# Patient Record
Sex: FEMALE | HISPANIC OR LATINO | Employment: UNEMPLOYED | URBAN - METROPOLITAN AREA
[De-identification: names, ages, dates, MRNs, and addresses within clinical notes are randomized per-mention and may not be internally consistent; named-entity substitution may affect disease eponyms.]

---

## 2023-08-16 ENCOUNTER — HOSPITAL ENCOUNTER (OUTPATIENT)
Facility: HOSPITAL | Age: 28
Discharge: HOME | End: 2023-08-16
Attending: PEDIATRICS | Admitting: PEDIATRICS
Payer: COMMERCIAL

## 2023-08-16 VITALS
OXYGEN SATURATION: 100 % | SYSTOLIC BLOOD PRESSURE: 145 MMHG | BODY MASS INDEX: 37.21 KG/M2 | DIASTOLIC BLOOD PRESSURE: 85 MMHG | HEIGHT: 63 IN | WEIGHT: 210 LBS | TEMPERATURE: 98.5 F | RESPIRATION RATE: 17 BRPM | HEART RATE: 85 BPM

## 2023-08-16 DIAGNOSIS — N64.4 BREAST PAIN IN FEMALE: Primary | ICD-10-CM

## 2023-08-16 PROCEDURE — G0463 HOSPITAL OUTPT CLINIC VISIT: HCPCS | Performed by: PEDIATRICS

## 2023-08-16 RX ORDER — CLINDAMYCIN HYDROCHLORIDE 150 MG/1
450 CAPSULE ORAL 3 TIMES DAILY
Qty: 63 CAPSULE | Refills: 0 | Status: SHIPPED | OUTPATIENT
Start: 2023-08-16 | End: 2023-08-23

## 2023-08-16 RX ORDER — ALBUTEROL SULFATE 2.5 MG/3ML
2.5 SOLUTION RESPIRATORY (INHALATION) EVERY 4 HOURS PRN
COMMUNITY
Start: 2023-06-06

## 2023-08-16 NOTE — FSED PROVIDER NOTE
"Emergency Medicine Evaluation Note  Subjective   History of Present Illness    HPI: Amira Akers is a 28 y.o. female who presents to the ED with complaints of left breast pain that began without specific inciting event that began yesterday.  Patient denies any trauma, denies any bleeding, drainage, discharge or weep.  Patient states subjective fevers and chills.  Patient with history of asthma, no other medical problems, no immunocompromise status.  Patient denies any trauma.  No recent breast-feeding.  Patient denies concerns for pregnancy as menstrual cycle 2 weeks prior.  Patient states new left nipple inversion when pain started.  Patient without PCP in the area, recently moved to [facility].  No other concomitant symptoms. No other known aggravating or alleviating factors.      ROS  Review of Systems   Constitutional:  Positive for chills and fever. Negative for activity change and appetite change.   HENT: Negative.     Eyes: Negative.    Respiratory: Negative.     Cardiovascular: Negative.    Gastrointestinal: Negative.    Endocrine: Negative.    Genitourinary: Negative.    Musculoskeletal: Negative.    Skin:         Breast pain as described above.   Allergic/Immunologic: Negative.    Neurological: Negative.    Hematological: Negative.    Psychiatric/Behavioral: Negative.       Previous History:  No past medical history on file.  No past surgical history on file.     No family history on file.  Allergies   Allergen Reactions    Penicillins Hives     Current Outpatient Medications   Medication Instructions    albuterol (PROVENTIL) 2.5 mg, Inhalation, Every 4 Hours PRN    clindamycin (CLEOCIN) 450 mg, Oral, 3 Times Daily       Objective   Physical Exam  Patient Vitals for the past 24 hrs:   BP Temp Temp src Pulse Resp SpO2 Height Weight   08/16/23 1021 145/85 98.5 øF (36.9 øC) Oral 85 17 100 % 160 cm (63\") 95.3 kg (210 lb)     Physical Exam  Vitals and nursing note reviewed.   Constitutional:       " General: She is not in acute distress.     Appearance: She is normal weight. She is not toxic-appearing.   HENT:      Head: Normocephalic and atraumatic.      Nose: Nose normal. No congestion.      Mouth/Throat:      Mouth: Mucous membranes are moist.      Pharynx: Oropharynx is clear.   Eyes:      General:         Right eye: No discharge.         Left eye: No discharge.      Extraocular Movements: Extraocular movements intact.      Conjunctiva/sclera: Conjunctivae normal.   Cardiovascular:      Rate and Rhythm: Normal rate and regular rhythm.      Pulses: Normal pulses.      Heart sounds: Normal heart sounds.   Pulmonary:      Effort: Pulmonary effort is normal. No respiratory distress.      Breath sounds: Normal breath sounds. No wheezing, rhonchi or rales.   Chest:      Chest wall: No tenderness.   Abdominal:      General: Abdomen is flat. There is no distension.      Palpations: Abdomen is soft.      Tenderness: There is no abdominal tenderness. There is no right CVA tenderness, left CVA tenderness, guarding or rebound.   Musculoskeletal:         General: No swelling, tenderness, deformity or signs of injury. Normal range of motion.      Cervical back: Normal range of motion and neck supple.   Skin:     Capillary Refill: Capillary refill takes less than 2 seconds.      Comments: Breast exam performed with nurse Kelly at bedside.  Patient with tenderness palpation over lateral left breast without overlying skin changes.  Nipple inversion appreciated without surrounding skin changes.  No bleeding, drainage, discharge or weep.  Fibrous tissue appreciated without palpable pocket of fluctuance.  No adnexal lymphadenopathy.   Neurological:      General: No focal deficit present.      Mental Status: She is alert.   Psychiatric:         Mood and Affect: Mood normal.         Behavior: Behavior normal.       Results  Labs Reviewed - No data to display  No orders to display     The laboratory results, imaging results and  other diagnostic exam results were reviewed in the EMR.     Procedures  Procedures    Medical Decision Making    Patient presents to the ED as described above.  Vital signs stable and unremarkable.  Physical exam as described above.  No obvious evidence of infection or breast tissue however given tenderness with subjective fevers and chills, patient will be initiated on course of antibiotics.  Discussed at length multiple other possible etiologies of patient's symptoms and need to follow as an outpatient for further evaluation and management, potential advanced imaging as no advanced breast imaging available at this facility. Patient was  informed of physical exam findings.  They verbalized understanding and agreement with treatment care plan.  Given strict return precautions.  All questions answered.  Patient is predominately [place]-speaking, all interactions translated through bedside virtual interpretation services.    Diagnosis  Final diagnoses:   Breast pain in female       Disposition  ED Disposition       ED Disposition   Discharge    Condition   Stable    Comment   --             PATIENT CONNECTION - [facility] [address] [phone]  Call in 1 day  or primary care physician, for contunity of care    [place] [address] [address] [place] [id number] [phone]  Go to   As needed, If symptoms worsen

## 2023-08-16 NOTE — DISCHARGE INSTRUCTIONS
Amira Akers can safely take 1000 mg of acetaminophen (Tylenol)  and 600 mg of ibuprofen (Motrin) every six hours as needed.

## 2023-08-22 ENCOUNTER — APPOINTMENT (OUTPATIENT)
Dept: GENERAL RADIOLOGY | Facility: HOSPITAL | Age: 28
End: 2023-08-22
Payer: COMMERCIAL

## 2023-08-22 ENCOUNTER — HOSPITAL ENCOUNTER (EMERGENCY)
Facility: HOSPITAL | Age: 28
Discharge: HOME | End: 2023-08-22
Attending: EMERGENCY MEDICINE | Admitting: EMERGENCY MEDICINE
Payer: COMMERCIAL

## 2023-08-22 VITALS
SYSTOLIC BLOOD PRESSURE: 108 MMHG | BODY MASS INDEX: 38.45 KG/M2 | HEART RATE: 87 BPM | DIASTOLIC BLOOD PRESSURE: 71 MMHG | RESPIRATION RATE: 16 BRPM | OXYGEN SATURATION: 98 % | WEIGHT: 217 LBS | HEIGHT: 63 IN | TEMPERATURE: 99.3 F

## 2023-08-22 DIAGNOSIS — N64.4 BREAST PAIN, LEFT: Primary | ICD-10-CM

## 2023-08-22 DIAGNOSIS — S93.601A FOOT SPRAIN, RIGHT, INITIAL ENCOUNTER: ICD-10-CM

## 2023-08-22 LAB — B-HCG UR QL: NEGATIVE

## 2023-08-22 PROCEDURE — 81025 URINE PREGNANCY TEST: CPT | Performed by: EMERGENCY MEDICINE

## 2023-08-22 PROCEDURE — 73630 X-RAY EXAM OF FOOT: CPT

## 2023-08-22 PROCEDURE — 99283 EMERGENCY DEPT VISIT LOW MDM: CPT

## 2023-08-22 PROCEDURE — 73610 X-RAY EXAM OF ANKLE: CPT

## 2023-08-22 RX ORDER — MELOXICAM 15 MG/1
15 TABLET ORAL DAILY
Qty: 10 TABLET | Refills: 0 | Status: SHIPPED | OUTPATIENT
Start: 2023-08-22

## 2023-08-22 RX ORDER — DOXYCYCLINE 100 MG/1
100 CAPSULE ORAL 2 TIMES DAILY
Qty: 14 CAPSULE | Refills: 0 | Status: SHIPPED | OUTPATIENT
Start: 2023-08-22

## 2023-08-22 NOTE — ED PROVIDER NOTES
Subjective   History of Present Illness  History obtained with help of a .  Patient presents with complaints of pain left nipple.  She states that it is generalized into more of her breast.  She states started 4 days ago she has had no fever no discharge.  She states her nipple appears oily at times.  She has no complaints of right breast pain.  She reports her last period was the first of this month.  She also reports she fell yesterday injuring her right foot.  She complains of pain to right foot.  No complaints of knee or hip pain or other injury.  Review of Systems    No past medical history on file.    Allergies   Allergen Reactions    Penicillins Hives       No past surgical history on file.    No family history on file.    Social History     Socioeconomic History    Marital status: Single     No routine medications      Objective   Physical Exam  28-year-old female awake alert.  Generally well-developed well-nourished.  Examination of breast right breast appears normal.  Left breast appears normal.  There is no erythema induration or discharge.  She does complain of some tenderness though.  No masses appreciated.  Examination of legs no hip or knee pain.  She has no ankle pain.  She complains of pain to the dorsal lateral aspect of foot.  No soft tissue swelling or bruising.  She has no complaints of arm or left leg pain.  Procedures           ED Course      Results for orders placed or performed during the hospital encounter of 08/22/23   Pregnancy, Urine - Urine, Clean Catch    Specimen: Urine, Clean Catch   Result Value Ref Range    HCG, Urine QL Negative Negative     XR Ankle 3+ View Right    Result Date: 8/22/2023  Impression: Negative. Electronically Signed: Yvette Vargas MD  8/22/2023 1:35 PM EDT  Workstation ID: [id number]    XR Foot 3+ View Right    Result Date: 8/22/2023  Impression: Negative. Electronically Signed: Yvette Vargas MD  8/22/2023 2:04 PM EDT  Workstation ID: [id number]  "  Medications - No data to display  /71 (BP Location: Left arm, Patient Position: Sitting)   Pulse 87   Temp 99.3 øF (37.4 øC) (Oral)   Resp 16   Ht 160 cm (63\")   Wt 98.4 kg (217 lb)   LMP 08/08/2023   SpO2 98%   BMI 38.44 kg/mý                                        Medical Decision Making  Amount and/or Complexity of Data Reviewed  Radiology: ordered.    Chart review: Patient was noted to have been seen on the 16th for complaint of breast pain at outside ER.  No obvious abnormality noted in either  Comorbidity: As per past history   Differential: Mastitis, strain, fracture  My EKG interpretation: Not indicated  Lab: Urine hCG negative  My Radiology review and interpretation: X-rays of right foot and ankle negative for fracture or dislocation.  Discussion/treatment: Patient's findings were discussed with her.  No obvious abnormality left breast is appreciated.  Since she is complained of pain we will place her on doxycycline and Mobic for inflammation to cover the possibility that there could be occult infection.  The importance of follow-up was discussed.  Patient was evaluated using appropriate PPE      Final diagnoses:   Breast pain, left   Foot sprain, right, initial encounter       ED Disposition  ED Disposition       ED Disposition   Discharge    Condition   Stable    Comment   --               No follow-up provider specified.       Medication List        New Prescriptions      doxycycline 100 MG capsule  Commonly known as: MONODOX  Take 1 capsule by mouth 2 (Two) Times a Day.     meloxicam 15 MG tablet  Commonly known as: Mobic  Take 1 tablet by mouth Daily. Prn pain               Where to Get Your Medications        These medications were sent to [facility]/pharmacy #[id number] - [facility] [place] - [address] [facility] - [phone] [place] [phone]   [address] [facility] [place] [address]      Phone: [phone]   doxycycline 100 MG capsule  meloxicam 15 MG tablet            Sp Butterfield MD  08/24/23 " 8035

## 2023-08-22 NOTE — ED NOTES
Patient complains of pain on the top of her foot and bottom of her foot. Patient also complains of breast tenderness. No discharge noticed at this time.

## 2023-08-22 NOTE — DISCHARGE INSTRUCTIONS
Follow-up with primary provider for repeat evaluation of breast.  Ice elevate foot restrict ambulation as needed.  Return new or worsening symptoms

## 2023-12-20 ENCOUNTER — HOSPITAL ENCOUNTER (EMERGENCY)
Facility: HOSPITAL | Age: 28
Discharge: HOME | End: 2023-12-20
Attending: EMERGENCY MEDICINE | Admitting: EMERGENCY MEDICINE
Payer: MEDICAID

## 2023-12-20 ENCOUNTER — APPOINTMENT (OUTPATIENT)
Dept: GENERAL RADIOLOGY | Facility: HOSPITAL | Age: 28
End: 2023-12-20
Payer: MEDICAID

## 2023-12-20 VITALS
BODY MASS INDEX: 38.45 KG/M2 | HEIGHT: 63 IN | RESPIRATION RATE: 16 BRPM | WEIGHT: 217 LBS | TEMPERATURE: 98.3 F | OXYGEN SATURATION: 98 % | DIASTOLIC BLOOD PRESSURE: 67 MMHG | HEART RATE: 75 BPM | SYSTOLIC BLOOD PRESSURE: 100 MMHG

## 2023-12-20 DIAGNOSIS — M79.671 RIGHT FOOT PAIN: Primary | ICD-10-CM

## 2023-12-20 PROCEDURE — 73630 X-RAY EXAM OF FOOT: CPT

## 2023-12-20 PROCEDURE — 99282 EMERGENCY DEPT VISIT SF MDM: CPT

## 2023-12-20 NOTE — DISCHARGE INSTRUCTIONS
Follow-up with podiatry.  Return for increased pain, swelling, redness or any other concerns.  Use a wrap for support.

## 2023-12-20 NOTE — ED PROVIDER NOTES
"Subjective   History of Present Illness  28-year-old  female describes some right foot pain over the last 1 year.  She describes pain along the top of her right foot with ambulation.  States she thinks she may have twisted at work over the last 1 year.  She reports no redness or swelling or fever.  History is through the friend  at the bedside.  Review of Systems    No past medical history on file.  Reportedly negative  Allergies   Allergen Reactions    Penicillins Hives       No past surgical history on file.    No family history on file.    Social History     Socioeconomic History    Marital status: Single     Prior to Admission medications    Medication Sig Start Date End Date Taking? Authorizing Provider   albuterol (PROVENTIL) (2.5 MG/3ML) 0.083% nebulizer solution Inhale 2.5 mg Every 4 (Four) Hours As Needed. 6/6/23   Provider, MD Simona   doxycycline (MONODOX) 100 MG capsule Take 1 capsule by mouth 2 (Two) Times a Day. 8/22/23   Sp Butterfield MD   meloxicam (Mobic) 15 MG tablet Take 1 tablet by mouth Daily. Prn pain 8/22/23   pS Butterfield MD     /67   Pulse 75   Temp 98.3 °F (36.8 °C) (Oral)   Resp 16   Ht 160 cm (63\")   Wt 98.4 kg (217 lb)   LMP 12/20/2023 (Approximate)   SpO2 98%   BMI 38.44 kg/m²         Objective   Physical Exam  General: Well-appearing, no acute distress  Psych: Oriented, pleasant affect  Respirations:  nonlabored respirations  Extremity: There is some mild tenderness palpation across the dorsal aspect of the right midfoot, no swelling or erythema, normal pulses in the foot and brisk cap refill distally, no ankle tenderness, plantar surface normal, normal sensorimotor function throughout the foot, calves are symmetric and nontender, Achilles nontender  Skin: No rash, normal color  Procedures           ED Course      XR Foot 3+ View Right    Result Date: 12/20/2023  Impression: Normal right foot. Electronically Signed: Vane Sadler MD  " 12/20/2023 12:47 PM EST  Workstation ID: [id number]                                          Medical Decision Making  Patient presented with some right foot pain differential diagnose including cellulitis, ischemia, compartment syndrome, trauma, foreign body    X-ray was negative.  Patient symptoms have been ongoing for about a year.  I suspect she does have some tendinitis based on location of discomfort.  At the time of discharge patient was found to not be present in the room she had apparently eloped with family so there was no opportunity to go over her results or recommendations.    Problems Addressed:  Right foot pain: complicated acute illness or injury    Amount and/or Complexity of Data Reviewed  Radiology: ordered and independent interpretation performed.     Details: My independent interpretation of x-ray image of the foot no apparent acute bony injury        Final diagnoses:   Right foot pain       ED Disposition  ED Disposition       ED Disposition   Discharge    Condition   Stable    Comment   --               No follow-up provider specified.       Medication List      No changes were made to your prescriptions during this visit.            Uriah Jeff MD  12/20/23 6840

## 2024-01-16 ENCOUNTER — HOSPITAL ENCOUNTER (OUTPATIENT)
Facility: HOSPITAL | Age: 29
Discharge: HOME | End: 2024-01-16
Attending: EMERGENCY MEDICINE | Admitting: EMERGENCY MEDICINE
Payer: MEDICAID

## 2024-01-16 VITALS
HEIGHT: 63 IN | TEMPERATURE: 98.6 F | RESPIRATION RATE: 18 BRPM | DIASTOLIC BLOOD PRESSURE: 70 MMHG | WEIGHT: 237 LBS | OXYGEN SATURATION: 99 % | HEART RATE: 93 BPM | BODY MASS INDEX: 41.99 KG/M2 | SYSTOLIC BLOOD PRESSURE: 135 MMHG

## 2024-01-16 DIAGNOSIS — J10.1 INFLUENZA B: Primary | ICD-10-CM

## 2024-01-16 DIAGNOSIS — J02.0 STREP THROAT: ICD-10-CM

## 2024-01-16 DIAGNOSIS — R05.1 ACUTE COUGH: ICD-10-CM

## 2024-01-16 DIAGNOSIS — R09.82 POSTNASAL DISCHARGE: ICD-10-CM

## 2024-01-16 LAB
FLUAV SUBTYP SPEC NAA+PROBE: NOT DETECTED
FLUBV RNA ISLT QL NAA+PROBE: DETECTED
RSV RNA NPH QL NAA+NON-PROBE: NOT DETECTED
SARS-COV-2 RNA RESP QL NAA+PROBE: NOT DETECTED
STREP A PCR: DETECTED

## 2024-01-16 PROCEDURE — G0463 HOSPITAL OUTPT CLINIC VISIT: HCPCS | Performed by: PHYSICIAN ASSISTANT

## 2024-01-16 PROCEDURE — 87651 STREP A DNA AMP PROBE: CPT

## 2024-01-16 PROCEDURE — 87637 SARSCOV2&INF A&B&RSV AMP PRB: CPT

## 2024-01-16 RX ORDER — AZITHROMYCIN 250 MG/1
TABLET, FILM COATED ORAL
Qty: 6 TABLET | Refills: 0 | Status: SHIPPED | OUTPATIENT
Start: 2024-01-16

## 2024-01-16 RX ORDER — METHYLPREDNISOLONE 4 MG/1
TABLET ORAL
Qty: 21 TABLET | Refills: 0 | Status: SHIPPED | OUTPATIENT
Start: 2024-01-16

## 2024-01-16 RX ORDER — LEVOCETIRIZINE DIHYDROCHLORIDE 5 MG/1
5 TABLET, FILM COATED ORAL EVERY EVENING
Qty: 30 TABLET | Refills: 0 | Status: SHIPPED | OUTPATIENT
Start: 2024-01-16 | End: 2024-02-15

## 2024-01-16 RX ORDER — OSELTAMIVIR PHOSPHATE 75 MG/1
75 CAPSULE ORAL 2 TIMES DAILY
Qty: 10 CAPSULE | Refills: 0 | Status: SHIPPED | OUTPATIENT
Start: 2024-01-16 | End: 2024-01-21

## 2024-01-16 NOTE — FSED PROVIDER NOTE
Subjective   History of Present Illness  Patient presents to the clinic today complaining of sinus pressure, nasal congestion, nasal drainage and cough.  Patient symptoms started yesterday.  Patient denies any chest pain, shortness of breath, abdominal pain, nausea, vomiting or fever.      Review of Systems   Constitutional:  Negative for chills and fever.   HENT:  Positive for postnasal drip, rhinorrhea, sinus pressure and sinus pain. Negative for ear pain and sore throat.    Respiratory:  Positive for cough. Negative for shortness of breath.    Cardiovascular:  Negative for chest pain.   Gastrointestinal:  Negative for abdominal pain, constipation, diarrhea, nausea and vomiting.   Musculoskeletal:  Negative for arthralgias, myalgias and neck pain.   Skin:  Negative for rash and wound.   Neurological:  Negative for dizziness, weakness, light-headedness and headaches.   All other systems reviewed and are negative.      History reviewed. No pertinent past medical history.    Allergies   Allergen Reactions    Penicillins Hives       History reviewed. No pertinent surgical history.    History reviewed. No pertinent family history.    Social History     Socioeconomic History    Marital status: Single   Tobacco Use    Smoking status: Never   Substance and Sexual Activity    Alcohol use: Not Currently    Drug use: Not Currently           Objective   Physical Exam  Vitals and nursing note reviewed.   Constitutional:       General: She is not in acute distress.     Appearance: Normal appearance. She is obese. She is not ill-appearing or toxic-appearing.   HENT:      Head: Normocephalic and atraumatic.      Right Ear: Tympanic membrane, ear canal and external ear normal. There is no impacted cerumen.      Left Ear: Tympanic membrane, ear canal and external ear normal. There is no impacted cerumen.      Nose: Congestion and rhinorrhea present.      Mouth/Throat:      Mouth: Mucous membranes are moist.      Pharynx:  Oropharyngeal exudate and posterior oropharyngeal erythema present.   Eyes:      General: No scleral icterus.        Right eye: No discharge.         Left eye: No discharge.      Extraocular Movements: Extraocular movements intact.      Conjunctiva/sclera: Conjunctivae normal.      Pupils: Pupils are equal, round, and reactive to light.   Cardiovascular:      Rate and Rhythm: Normal rate and regular rhythm.      Pulses: Normal pulses.      Heart sounds: Normal heart sounds. No murmur heard.     No friction rub. No gallop.   Pulmonary:      Effort: Pulmonary effort is normal. No respiratory distress.      Breath sounds: Normal breath sounds.   Musculoskeletal:         General: No swelling, tenderness or signs of injury. Normal range of motion.      Cervical back: Normal range of motion. No tenderness.   Skin:     General: Skin is warm and dry.      Coloration: Skin is not pale.      Findings: No erythema or rash.   Neurological:      General: No focal deficit present.      Mental Status: She is alert and oriented to person, place, and time.      Cranial Nerves: No cranial nerve deficit.   Psychiatric:         Mood and Affect: Mood normal.         Behavior: Behavior normal.         Thought Content: Thought content normal.         Judgment: Judgment normal.         Procedures           ED Course                                           Medical Decision Making  Patient was negative for RSV and COVID-19 but positive for influenza B and strep throat.  These results were gone over with her in the clinic today.  I am not concerned for sinusitis, peritonsillar abscess, retropharyngeal abscess or pneumonia.  Patient will be discharged home with a prescription for Tamiflu, azithromycin, Medrol Dosepak and Xyzal.  Patient was given the number to contact to set up a primary care provider.  Patient return to clinic if symptoms persist or worsen.    Problems Addressed:  Acute cough: complicated acute illness or injury  Influenza  B: complicated acute illness or injury  Postnasal discharge: complicated acute illness or injury  Strep throat: complicated acute illness or injury    Risk  Prescription drug management.        Final diagnoses:   Influenza B   Strep throat   Acute cough   Postnasal discharge       ED Disposition  ED Disposition       ED Disposition   Discharge    Condition   Stable    Comment   --               PATIENT CONNECTION - [facility]  [place] [address]  [phone]  Call   to set up primary care provider         Medication List        New Prescriptions      azithromycin 250 MG tablet  Commonly known as: Zithromax Z-Fadi  Take 2 tablets by mouth on day 1, then 1 tablet daily on days 2-5     levocetirizine 5 MG tablet  Commonly known as: XYZAL  Take 1 tablet by mouth Every Evening for 30 days.     methylPREDNISolone 4 MG dose pack  Commonly known as: MEDROL  Take as directed on package instructions.     oseltamivir 75 MG capsule  Commonly known as: TAMIFLU  Take 1 capsule by mouth 2 (Two) Times a Day for 5 days.               Where to Get Your Medications        These medications were sent to [facility]/pharmacy #[id number] - [place], [place] - [address] [place] [address] - [phone]  - [phone] [address] [place] [address]      Hours: 24-hours Phone: [phone]   azithromycin 250 MG tablet  levocetirizine 5 MG tablet  methylPREDNISolone 4 MG dose pack  oseltamivir 75 MG capsule

## 2024-04-04 ENCOUNTER — HOSPITAL ENCOUNTER (EMERGENCY)
Facility: HOSPITAL | Age: 29
Discharge: HOME | End: 2024-04-04
Attending: EMERGENCY MEDICINE
Payer: MEDICAID

## 2024-04-04 VITALS
SYSTOLIC BLOOD PRESSURE: 114 MMHG | HEIGHT: 62 IN | BODY MASS INDEX: 44.22 KG/M2 | WEIGHT: 240.3 LBS | TEMPERATURE: 97.8 F | HEART RATE: 78 BPM | DIASTOLIC BLOOD PRESSURE: 99 MMHG | RESPIRATION RATE: 16 BRPM | OXYGEN SATURATION: 100 %

## 2024-04-04 DIAGNOSIS — R11.2 NAUSEA AND VOMITING, UNSPECIFIED VOMITING TYPE: ICD-10-CM

## 2024-04-04 DIAGNOSIS — R19.7 DIARRHEA, UNSPECIFIED TYPE: ICD-10-CM

## 2024-04-04 DIAGNOSIS — R10.84 GENERALIZED ABDOMINAL PAIN: Primary | ICD-10-CM

## 2024-04-04 LAB
ALBUMIN SERPL-MCNC: 4.4 G/DL (ref 3.5–5.2)
ALBUMIN/GLOB SERPL: 1.3 G/DL
ALP SERPL-CCNC: 104 U/L (ref 39–117)
ALT SERPL W P-5'-P-CCNC: 18 U/L (ref 1–33)
ANION GAP SERPL CALCULATED.3IONS-SCNC: 12 MMOL/L (ref 5–15)
AST SERPL-CCNC: 15 U/L (ref 1–32)
BACTERIA UR QL AUTO: ABNORMAL /HPF
BASOPHILS # BLD AUTO: 0.03 10*3/MM3 (ref 0–0.2)
BASOPHILS NFR BLD AUTO: 0.3 % (ref 0–1.5)
BILIRUB SERPL-MCNC: 0.8 MG/DL (ref 0–1.2)
BILIRUB UR QL STRIP: NEGATIVE
BUN SERPL-MCNC: 7 MG/DL (ref 6–20)
BUN/CREAT SERPL: 14.6 (ref 7–25)
CALCIUM SPEC-SCNC: 9.8 MG/DL (ref 8.6–10.5)
CHLORIDE SERPL-SCNC: 105 MMOL/L (ref 98–107)
CLARITY UR: CLEAR
CO2 SERPL-SCNC: 23 MMOL/L (ref 22–29)
COLOR UR: YELLOW
CREAT SERPL-MCNC: 0.48 MG/DL (ref 0.57–1)
DEPRECATED RDW RBC AUTO: 41.1 FL (ref 37–54)
EGFRCR SERPLBLD CKD-EPI 2021: 133.3 ML/MIN/1.73
EOSINOPHIL # BLD AUTO: 0.11 10*3/MM3 (ref 0–0.4)
EOSINOPHIL NFR BLD AUTO: 1 % (ref 0.3–6.2)
ERYTHROCYTE [DISTWIDTH] IN BLOOD BY AUTOMATED COUNT: 13.2 % (ref 12.3–15.4)
GLOBULIN UR ELPH-MCNC: 3.3 GM/DL
GLUCOSE SERPL-MCNC: 110 MG/DL (ref 65–99)
GLUCOSE UR STRIP-MCNC: NEGATIVE MG/DL
HCT VFR BLD AUTO: 39.7 % (ref 34–46.6)
HGB BLD-MCNC: 12.6 G/DL (ref 12–15.9)
HGB UR QL STRIP.AUTO: ABNORMAL
HOLD SPECIMEN: NORMAL
HYALINE CASTS UR QL AUTO: ABNORMAL /LPF
IMM GRANULOCYTES # BLD AUTO: 0.03 10*3/MM3 (ref 0–0.05)
IMM GRANULOCYTES NFR BLD AUTO: 0.3 % (ref 0–0.5)
KETONES UR QL STRIP: NEGATIVE
LEUKOCYTE ESTERASE UR QL STRIP.AUTO: NEGATIVE
LIPASE SERPL-CCNC: 22 U/L (ref 13–60)
LYMPHOCYTES # BLD AUTO: 1.52 10*3/MM3 (ref 0.7–3.1)
LYMPHOCYTES NFR BLD AUTO: 13.2 % (ref 19.6–45.3)
MCH RBC QN AUTO: 27.3 PG (ref 26.6–33)
MCHC RBC AUTO-ENTMCNC: 31.7 G/DL (ref 31.5–35.7)
MCV RBC AUTO: 85.9 FL (ref 79–97)
MONOCYTES # BLD AUTO: 0.7 10*3/MM3 (ref 0.1–0.9)
MONOCYTES NFR BLD AUTO: 6.1 % (ref 5–12)
NEUTROPHILS NFR BLD AUTO: 79.1 % (ref 42.7–76)
NEUTROPHILS NFR BLD AUTO: 9.15 10*3/MM3 (ref 1.7–7)
NITRITE UR QL STRIP: NEGATIVE
NRBC BLD AUTO-RTO: 0 /100 WBC (ref 0–0.2)
PH UR STRIP.AUTO: <=5 [PH] (ref 5–8)
PLATELET # BLD AUTO: 296 10*3/MM3 (ref 140–450)
PMV BLD AUTO: 10.2 FL (ref 6–12)
POTASSIUM SERPL-SCNC: 3.6 MMOL/L (ref 3.5–5.2)
PROT SERPL-MCNC: 7.7 G/DL (ref 6–8.5)
PROT UR QL STRIP: NEGATIVE
RBC # BLD AUTO: 4.62 10*6/MM3 (ref 3.77–5.28)
RBC # UR STRIP: ABNORMAL /HPF
REF LAB TEST METHOD: ABNORMAL
SODIUM SERPL-SCNC: 140 MMOL/L (ref 136–145)
SP GR UR STRIP: 1.02 (ref 1–1.03)
SQUAMOUS #/AREA URNS HPF: ABNORMAL /HPF
UROBILINOGEN UR QL STRIP: ABNORMAL
WBC # UR STRIP: ABNORMAL /HPF
WBC NRBC COR # BLD AUTO: 11.54 10*3/MM3 (ref 3.4–10.8)
WHOLE BLOOD HOLD COAG: NORMAL

## 2024-04-04 PROCEDURE — 85025 COMPLETE CBC W/AUTO DIFF WBC: CPT | Performed by: EMERGENCY MEDICINE

## 2024-04-04 PROCEDURE — 96375 TX/PRO/DX INJ NEW DRUG ADDON: CPT

## 2024-04-04 PROCEDURE — 25010000002 ONDANSETRON PER 1 MG: Performed by: EMERGENCY MEDICINE

## 2024-04-04 PROCEDURE — 96374 THER/PROPH/DIAG INJ IV PUSH: CPT

## 2024-04-04 PROCEDURE — 25010000002 MORPHINE PER 10 MG: Performed by: EMERGENCY MEDICINE

## 2024-04-04 PROCEDURE — 83690 ASSAY OF LIPASE: CPT | Performed by: EMERGENCY MEDICINE

## 2024-04-04 PROCEDURE — 80053 COMPREHEN METABOLIC PANEL: CPT | Performed by: EMERGENCY MEDICINE

## 2024-04-04 PROCEDURE — 99283 EMERGENCY DEPT VISIT LOW MDM: CPT

## 2024-04-04 PROCEDURE — 25810000003 SODIUM CHLORIDE 0.9 % SOLUTION: Performed by: EMERGENCY MEDICINE

## 2024-04-04 PROCEDURE — 81001 URINALYSIS AUTO W/SCOPE: CPT | Performed by: EMERGENCY MEDICINE

## 2024-04-04 PROCEDURE — 25010000002 KETOROLAC TROMETHAMINE PER 15 MG: Performed by: EMERGENCY MEDICINE

## 2024-04-04 RX ORDER — ONDANSETRON 4 MG/1
4 TABLET, ORALLY DISINTEGRATING ORAL EVERY 8 HOURS PRN
Qty: 12 TABLET | Refills: 0 | Status: SHIPPED | OUTPATIENT
Start: 2024-04-04

## 2024-04-04 RX ORDER — MORPHINE SULFATE 2 MG/ML
2 INJECTION, SOLUTION INTRAMUSCULAR; INTRAVENOUS ONCE
Status: COMPLETED | OUTPATIENT
Start: 2024-04-04 | End: 2024-04-04

## 2024-04-04 RX ORDER — KETOROLAC TROMETHAMINE 30 MG/ML
15 INJECTION, SOLUTION INTRAMUSCULAR; INTRAVENOUS ONCE
Status: COMPLETED | OUTPATIENT
Start: 2024-04-04 | End: 2024-04-04

## 2024-04-04 RX ORDER — SODIUM CHLORIDE 0.9 % (FLUSH) 0.9 %
10 SYRINGE (ML) INJECTION AS NEEDED
Status: DISCONTINUED | OUTPATIENT
Start: 2024-04-04 | End: 2024-04-04 | Stop reason: HOSPADM

## 2024-04-04 RX ORDER — ONDANSETRON 2 MG/ML
4 INJECTION INTRAMUSCULAR; INTRAVENOUS ONCE
Status: COMPLETED | OUTPATIENT
Start: 2024-04-04 | End: 2024-04-04

## 2024-04-04 RX ORDER — TRAMADOL HYDROCHLORIDE 50 MG/1
50 TABLET ORAL EVERY 6 HOURS PRN
Qty: 12 TABLET | Refills: 0 | Status: SHIPPED | OUTPATIENT
Start: 2024-04-04

## 2024-04-04 RX ADMIN — KETOROLAC TROMETHAMINE 15 MG: 30 INJECTION, SOLUTION INTRAMUSCULAR at 18:42

## 2024-04-04 RX ADMIN — SODIUM CHLORIDE 500 ML: 9 INJECTION, SOLUTION INTRAVENOUS at 16:40

## 2024-04-04 RX ADMIN — MORPHINE SULFATE 2 MG: 2 INJECTION, SOLUTION INTRAMUSCULAR; INTRAVENOUS at 16:46

## 2024-04-04 RX ADMIN — ONDANSETRON 4 MG: 2 INJECTION INTRAMUSCULAR; INTRAVENOUS at 16:40

## 2024-04-04 NOTE — ED PROVIDER NOTES
Subjective   History of Present Illness  Patient 28 old female complaint of abdominal pain vomit diarrhea for the past 24 hours.  States she also has pain in her back.  Denies cough fever congestion dysuria or other complaint.  Patient states she is having a very heavy menstrual cycle at this time as well.      Review of Systems    No past medical history on file.    Allergies   Allergen Reactions    Penicillins Hives    Amoxicillin Unknown - High Severity       No past surgical history on file.    No family history on file.    Social History     Socioeconomic History    Marital status: Single   Tobacco Use    Smoking status: Never   Substance and Sexual Activity    Alcohol use: Not Currently    Drug use: Not Currently           Objective   Physical Exam  Neck has no adenopathy.  Lungs are clear.  Heart has regular rhythm.  Chest nontender.  Abdomen soft with mild diffuse tenderness.  Patient with normal bowel sounds without rebound or guarding.  Back has no CVA tenderness.  Procedures           ED Course      Results for orders placed or performed during the hospital encounter of 04/04/24   Comprehensive Metabolic Panel    Specimen: Blood   Result Value Ref Range    Glucose 110 (H) 65 - 99 mg/dL    BUN 7 6 - 20 mg/dL    Creatinine 0.48 (L) 0.57 - 1.00 mg/dL    Sodium 140 136 - 145 mmol/L    Potassium 3.6 3.5 - 5.2 mmol/L    Chloride 105 98 - 107 mmol/L    CO2 23.0 22.0 - 29.0 mmol/L    Calcium 9.8 8.6 - 10.5 mg/dL    Total Protein 7.7 6.0 - 8.5 g/dL    Albumin 4.4 3.5 - 5.2 g/dL    ALT (SGPT) 18 1 - 33 U/L    AST (SGOT) 15 1 - 32 U/L    Alkaline Phosphatase 104 39 - 117 U/L    Total Bilirubin 0.8 0.0 - 1.2 mg/dL    Globulin 3.3 gm/dL    A/G Ratio 1.3 g/dL    BUN/Creatinine Ratio 14.6 7.0 - 25.0    Anion Gap 12.0 5.0 - 15.0 mmol/L    eGFR 133.3 >60.0 mL/min/1.73   Lipase    Specimen: Blood   Result Value Ref Range    Lipase 22 13 - 60 U/L   Urinalysis With Microscopic If Indicated (No Culture) - Urine, Clean Catch     Specimen: Urine, Clean Catch   Result Value Ref Range    Color, UA Yellow Yellow, Straw    Appearance, UA Clear Clear    pH, UA <=5.0 5.0 - 8.0    Specific Gravity, UA 1.022 1.005 - 1.030    Glucose, UA Negative Negative    Ketones, UA Negative Negative    Bilirubin, UA Negative Negative    Blood, UA Large (3+) (A) Negative    Protein, UA Negative Negative    Leuk Esterase, UA Negative Negative    Nitrite, UA Negative Negative    Urobilinogen, UA 0.2 E.U./dL 0.2 - 1.0 E.U./dL   CBC Auto Differential    Specimen: Blood   Result Value Ref Range    WBC 11.54 (H) 3.40 - 10.80 10*3/mm3    RBC 4.62 3.77 - 5.28 10*6/mm3    Hemoglobin 12.6 12.0 - 15.9 g/dL    Hematocrit 39.7 34.0 - 46.6 %    MCV 85.9 79.0 - 97.0 fL    MCH 27.3 26.6 - 33.0 pg    MCHC 31.7 31.5 - 35.7 g/dL    RDW 13.2 12.3 - 15.4 %    RDW-SD 41.1 37.0 - 54.0 fl    MPV 10.2 6.0 - 12.0 fL    Platelets 296 140 - 450 10*3/mm3    Neutrophil % 79.1 (H) 42.7 - 76.0 %    Lymphocyte % 13.2 (L) 19.6 - 45.3 %    Monocyte % 6.1 5.0 - 12.0 %    Eosinophil % 1.0 0.3 - 6.2 %    Basophil % 0.3 0.0 - 1.5 %    Immature Grans % 0.3 0.0 - 0.5 %    Neutrophils, Absolute 9.15 (H) 1.70 - 7.00 10*3/mm3    Lymphocytes, Absolute 1.52 0.70 - 3.10 10*3/mm3    Monocytes, Absolute 0.70 0.10 - 0.90 10*3/mm3    Eosinophils, Absolute 0.11 0.00 - 0.40 10*3/mm3    Basophils, Absolute 0.03 0.00 - 0.20 10*3/mm3    Immature Grans, Absolute 0.03 0.00 - 0.05 10*3/mm3    nRBC 0.0 0.0 - 0.2 /100 WBC   Urinalysis, Microscopic Only - Urine, Clean Catch    Specimen: Urine, Clean Catch   Result Value Ref Range    RBC, UA Too Numerous to Count (A) None Seen, 0-2 /HPF    WBC, UA 0-2 None Seen, 0-2 /HPF    Bacteria, UA None Seen None Seen /HPF    Squamous Epithelial Cells, UA 0-2 None Seen, 0-2 /HPF    Hyaline Casts, UA None Seen None Seen /LPF    Methodology Automated Microscopy    Gold Top - SST   Result Value Ref Range    Extra Tube Hold for add-ons.    Light Blue Top   Result Value Ref Range    Extra  Tube Hold for add-ons.                                               Medical Decision Making  BMP shows no renal insufficiency or electrolyte abnormality.  LFTs normal with no evidence of hepatitis.  Lipase was normal without evidence of pancreatitis.  UA has red blood cells in it as noted patient is having an IV menstrual cycle as well.  There is no evidence of UTI.  Patient given morphine IV with improvement.  Due to the vomiting diarrhea and abdominal pain patient most consistent with gastroenteritis with abdominal pain.  She will be discharged with a prescription for Ultram and Zofran.  She will see MD for recheck.    Amount and/or Complexity of Data Reviewed  Labs: ordered. Decision-making details documented in ED Course.    Risk  Prescription drug management.        Final diagnoses:   Generalized abdominal pain   Nausea and vomiting, unspecified vomiting type   Diarrhea, unspecified type       ED Disposition  ED Disposition       ED Disposition   Discharge    Condition   Stable    Comment   --               No follow-up provider specified.       Medication List        New Prescriptions      ondansetron ODT 4 MG disintegrating tablet  Commonly known as: ZOFRAN-ODT  Place 1 tablet on the tongue Every 8 (Eight) Hours As Needed for Vomiting.     traMADol 50 MG tablet  Commonly known as: ULTRAM  Take 1 tablet by mouth Every 6 (Six) Hours As Needed for Severe Pain.               Where to Get Your Medications        These medications were sent to [facility]/pharmacy #[id number] - [place], [place] - [address] [place] [address] - [phone]  - [phone] [address] [place] [address]      Hours: 24-hours Phone: [phone]   ondansetron ODT 4 MG disintegrating tablet  traMADol 50 MG tablet            Milan Bhatti MD  04/04/24 0658

## 2024-08-27 ENCOUNTER — HOSPITAL ENCOUNTER (OUTPATIENT)
Facility: HOSPITAL | Age: 29
Discharge: HOME | End: 2024-08-27
Attending: EMERGENCY MEDICINE
Payer: MEDICAID

## 2024-08-27 VITALS
BODY MASS INDEX: 42.82 KG/M2 | WEIGHT: 241.7 LBS | HEART RATE: 94 BPM | SYSTOLIC BLOOD PRESSURE: 123 MMHG | HEIGHT: 63 IN | DIASTOLIC BLOOD PRESSURE: 77 MMHG | OXYGEN SATURATION: 99 % | TEMPERATURE: 99.2 F | RESPIRATION RATE: 18 BRPM

## 2024-08-27 DIAGNOSIS — Z87.09 HISTORY OF ASTHMA: ICD-10-CM

## 2024-08-27 DIAGNOSIS — U07.1 COVID-19: Primary | ICD-10-CM

## 2024-08-27 LAB
FLUAV SUBTYP SPEC NAA+PROBE: NOT DETECTED
FLUBV RNA ISLT QL NAA+PROBE: NOT DETECTED
SARS-COV-2 RNA RESP QL NAA+PROBE: DETECTED

## 2024-08-27 PROCEDURE — 99213 OFFICE O/P EST LOW 20 MIN: CPT | Performed by: NURSE PRACTITIONER

## 2024-08-27 PROCEDURE — 87636 SARSCOV2 & INF A&B AMP PRB: CPT

## 2024-08-27 PROCEDURE — G0463 HOSPITAL OUTPT CLINIC VISIT: HCPCS | Performed by: NURSE PRACTITIONER

## 2024-08-27 RX ORDER — LEVOCETIRIZINE DIHYDROCHLORIDE 5 MG/1
5 TABLET, FILM COATED ORAL EVERY EVENING
Qty: 30 TABLET | Refills: 0 | Status: SHIPPED | OUTPATIENT
Start: 2024-08-27 | End: 2024-09-26

## 2024-08-27 RX ORDER — ALBUTEROL SULFATE 90 UG/1
2 AEROSOL, METERED RESPIRATORY (INHALATION) EVERY 4 HOURS PRN
Qty: 6.7 G | Refills: 0 | Status: SHIPPED | OUTPATIENT
Start: 2024-08-27

## 2024-08-28 NOTE — FSED PROVIDER NOTE
EMERGENCY DEPARTMENT ENCOUNTER    Room Number:  11/11  Date seen:  8/27/2024  Time seen: 21:53 EDT  PCP: Provider, No Known  Historian: patient  Manage  used ID number [id number]    Discussed/obtained information from independent historians: n/a    HPI:  Chief complaint:URI symptoms, covid exposure  A complete HPI/ROS/PMH/PSH/SH/FH are unobtainable due to: n/a  Context:Amira Akers is a 29 y.o. female with a past medical history significant for asthma though she has not needed to use a recent inhaler.  She presents with cough, all over body aches and headache that started yesterday.  She has taken Tylenol for her symptoms.  She denies any dyspnea on exertion or GI symptoms.    ALLERGIES  Penicillins and Amoxicillin    PAST MEDICAL HISTORY  Active Ambulatory Problems     Diagnosis Date Noted    No Active Ambulatory Problems     Resolved Ambulatory Problems     Diagnosis Date Noted    No Resolved Ambulatory Problems     No Additional Past Medical History       PAST SURGICAL HISTORY  No past surgical history on file.    FAMILY HISTORY  No family history on file.    SOCIAL HISTORY  Social History     Socioeconomic History    Marital status: Single   Tobacco Use    Smoking status: Never   Substance and Sexual Activity    Alcohol use: Yes     Comment: occ    Drug use: Not Currently       REVIEW OF SYSTEMS  Review of Systems    All systems reviewed and negative except for those discussed in HPI.     PHYSICAL EXAM    I have reviewed the triage vital signs and nursing notes.  Vitals:    08/27/24 2109   BP: 123/77   Pulse: 94   Resp: 18   Temp: 99.2 °F (37.3 °C)   SpO2: 99%     Physical Exam    GENERAL: not distressed, mildly ill-appearing but not toxic  HENT: nares patent  EYES: no scleral icterus  NECK: no ROM limitations  CV: regular rhythm, regular rate  RESPIRATORY: normal effort, clear to auscultate bilaterally.  No wheezing or stridor.  She does have moderate coughing  ABDOMEN: soft  :  deferred  MUSCULOSKELETAL: no deformity  NEURO: alert, moves all extremities, follows commands  SKIN: warm, dry    LAB RESULTS  Recent Results (from the past 24 hour(s))   COVID-19 and FLU A/B PCR, 1 HR TAT - Swab, Nasopharynx    Collection Time: 08/27/24  9:12 PM    Specimen: Nasopharynx; Swab   Result Value Ref Range    COVID19 Detected (C) Not Detected - Ref. Range    Influenza A PCR Not Detected Not Detected    Influenza B PCR Not Detected Not Detected       Ordered the above labs and independently interpreted results.  My findings will be discussed in the ED course or medical decision making section below      PROGRESS, DATA ANALYSIS, CONSULTS AND MEDICAL DECISION MAKING    Please note that this section constitutes my independent interpretation of clinical data including lab results, radiology, EKG's.  This constitutes my independent professional opinion regarding differential diagnosis and management of this patient.  It may include any factors such as history from outside sources, review of external records, social determinants of health, management of medications, response to those treatments, and discussions with other providers.       Orders placed during this visit:  Orders Placed This Encounter   Procedures    COVID-19 and FLU A/B PCR, 1 HR TAT - Swab, Nasopharynx            Medical Decision Making  Problems Addressed:  COVID-19: complicated acute illness or injury  History of asthma: complicated acute illness or injury    Risk  Prescription drug management.    This patient presents with symptoms suspicious for likely viral upper respiratory infection and a Covid exposure. Based on history and physical doubt sinusitis. COVID test positive today.  I do not suspect underlying cardiopulmonary process. I considered, but think unlikely, dangerous causes of this patient’s symptoms to include ACS, CHF or COPD exacerbations, pneumonia, pneumothorax.The patient does have h/o asthma. She is not wheezing on exam  but I did refill her allergy medication and inhaler.  Patient is nontoxic appearing and not in need of emergent medical intervention.        DIAGNOSIS  Final diagnoses:   COVID-19   History of asthma          Medication List        Changed      * albuterol (2.5 MG/3ML) 0.083% nebulizer solution  Commonly known as: PROVENTIL  What changed: Another medication with the same name was added. Make sure you understand how and when to take each.     * albuterol sulfate  (90 Base) MCG/ACT inhaler  Commonly known as: PROVENTIL HFA;VENTOLIN HFA;PROAIR HFA  Inhale 2 puffs Every 4 (Four) Hours As Needed for Shortness of Air or Wheezing.  What changed: You were already taking a medication with the same name, and this prescription was added. Make sure you understand how and when to take each.           * This list has 2 medication(s) that are the same as other medications prescribed for you. Read the directions carefully, and ask your doctor or other care provider to review them with you.                   Where to Get Your Medications        These medications were sent to [facility]/pharmacy #[id number] - [facility] [place] - [address] [place] [address] - [phone]  - [phone] [address] [place] [address]      Hours: 24-hours Phone: [phone]   albuterol sulfate  (90 Base) MCG/ACT inhaler  levocetirizine 5 MG tablet         FOLLOW-UP  PATIENT CONNECTION - [facility] [address] [phone]    or make appointment with Primary Care or pediatrician        Latest Documented Vital Signs:  As of 22:45 EDT  BP- 123/77 HR- 94 Temp- 99.2 °F (37.3 °C) (Oral) O2 sat- 99%    Appropriate PPE utilized throughout this patient encounter to include mask, if indicated, per current protocol. Hand hygiene was performed before donning PPE and after removal when leaving the room.    Please note that portions of this were completed with a voice recognition program.     Note Disclaimer: At [facility], we believe  that sharing information builds trust and better relationships. You are receiving this note because you are receiving care at [facility] or recently visited. It is possible you will see health information before a provider has talked with you about it. This kind of information can be easy to misunderstand. To help you fully understand what it means for your health, we urge you to discuss this note with your provider.

## 2024-08-28 NOTE — DISCHARGE INSTRUCTIONS
"Please read through the information below. This information will provide you with additional instructions for home care.    Please start on a multivitamin if you are not already taking one. The best multivitamin available is the Prenatal Vitamin. It does not matter if you are a male or female. You are capable of taking this supplement. Vitamin C, Calcium, Magnesium and Zinc have shown with limited research data to be beneficial for helping the body fight off infections. [employer] have a single pill with Vitamin C, Zinc, Magnesium plus Vitamin D3. This is the most convient supplement to take with all of the additional key supplements for immune support.    COLD AND FLU     Colds are very common viral illnesses that occur year round, but primarily in winter months. Because there are many viruses that cause colds, people may experience multiple colds per year, or find that their symptoms vary with each infection. The flu is also caused by a virus, but is due to a specific virus called influenza virus. The typical symptoms of the flu are high fever, body aches, fatigue, and headache.     The main treatment for these viral infections is supportive care.     Supportive treatment consists of taking medications to treat the symptoms, so that you are more comfortable while you are recovering from the illness. Colds usually last 7-14 days and the flu about 7 days.     Over-the-counter medications for symptomatic relief may include: Mucinex (maximum 3 days), Sudafed, DayQuil/NyQuil, flonase nasal spray.  If you have history of high blood pressure please use caution with these medications.  Check with pharmacist for recommendations.  Coricidin has brand \"HBP\" which is better for patient's with high blood pressure.       If you tested positive for the flu, Tamiflu and Xofluza are prescription antiviral medications that work best if taken in the first 24 to 48 hours. If you have had symptoms longer than 48 hours " "this medication will not be beneficial. Research shows no decrease in length or severity of illness when Tamiflu is not started within 48 hours. These medications were considered when looking at your illness, symptoms and severity of your illness.     Antibiotics are also not beneficial for a viral illness. Antibiotics only work against bacteria, and not viruses.     Please read through the information below. This information will provide you with additional instructions for home care.    VIRAL RESPIRATORY INFECTIONS CAN CAUSE: SINUSITIS, CHEST COLDS, BRONCHITIS, SORE THROAT, EAR ACHES, EAR INFECTIONS, RESPIRATORY FLU, STOMACH FLU.    -ANTIBIOTICS SHOULD NOT BE USED FOR VIRAL INFECTIONS. OUR BODIES NEED 10-14 DAYS TO \"FIGHT OFF\" VIRAL INFECTIONS.   -TAKE ANTIBIOTICS ONLY IF: RECOMMENDED BY YOUR PROVIDER, YOU ARE GETTING WORSE, YOU ARE NOT GETTING BETTER IN 10-14 DAYS.  -AVOID UNNECESSARY ANTIBIOTICS, THE MORE ANTIBIOTICS PEOPLE USE THE MORE LIKELY THEY ARE TO GET INFECTIONS THAT LAST LONGER AND ARE MORE DIFFICULT TO TREAT  -COLD SYMPTOMS INCLUDE: RUNNY NOSE, NASAL CONGESTION, SNEEZING, SORE THROAT, COUGH AND HEADACHE  -CHILDREN WILL OFTEN RUN A FEVER -102 F  -COLDS OCCUR ALL YEAR ROUND  -CHILDREN WILL TYPICALLY GET 8 COLDS A YEAR    Virus precautions, simple things to do at home to help with illness:     Wash/sanitize common household surfaces with antibacterial wipes.  Especially door knobs, light switches, tablets, remote controls, game consoles, cell phones.     Change bed linens and wash bath towels/washcloths    Frequent handwashing    Cough/sneeze into your sleeve      Within one to three days, the nasal secretions usually become thicker and perhaps yellow or green. This is a normal part of the common cold and not a reason for antibiotics. Symptoms usually go away in 7-10 days. If symptoms do not resolve in 7-10 days or worsen despite recommended treatment then re-evaluation must be sought as a viral " infection can turn into a bacterial infection    Please try OTC therapy for 3-5 days. This should help with your symptoms. It would be best to start on an OTC allergy pill or an OTC medication to treat the symptoms which you have.     Take an Allergy pill for congestion, runny nose, itchy watery eyes, sinus pain or pressure.     Tylenol/Ibuprofen (age appropriate dose) for pain or fever.     Additionally, plenty of rest and fluids are also important.     Below is a list of what to do for common symptoms associated with a cold or the flu. The recommendations below are for adult patients. Please make sure the treatments you use are age appropriate as not all information noted below can be given to children.       1. SORE THROAT Ibuprofen (also called Motrin & Advil) and acetaminophen (APAP or Tylenol ) are the most effective pain relievers for a sore throat.     Adults can take 2-3 ibuprofen every 6 hours with food or Tylenol 1000 mg up to 3 times a day as needed. Additionally, salt water gargles (1 tsp. salt in 1 cup of warm water) and lozenges may provide temporary relief of a sore throat.     For children over the age of two and adults - a spoonful of honey may be beneficial for both sore throat and cough.     For children, please follow the instructions on the package. There are different formularies to consider dosing    Sore throats are caused by many medical conditions including allergies, a virus, sinus issues, strep, and more.  Try to manage your sore throat at home. If over the counter medication has not been beneficial and your symptoms are continuing to worsen we ask you seek evaluation.    A severe sore throat should be evaluated by a practitioner, especially if accompanied by a fever over 100.3.   In talking with patients, most say that spraying with chloraseptic is not helpful.    Salt water gargles will help a sore throat.  For salt water gargles combine 1 teaspoon salt to 8 ounces of warm water and  swish and spit.  This can be done 4-5 times a day with a fresh batch of salt and warm water      2. NASAL CONGESTION The best nasal decongestant for most people is pseudoephedrine (Sudafed or a generic, but NOT Sudafed PE). For adults, the usual dose is 60 mg every 4 to 6 hours. This helps with a runny nose and clogged nasal passages, making it easier to breathe. Because Sudafed can be used to make illegal drugs, it is kept behind the counter at your local drugstore. This means you have to ask for the medication and are required to show your ’s license to buy it. Sudafed may make it difficult to sleep at night, so try to avoid taking any before bed if it bothers you. People with high blood pressure should not take pseudoephedrine, phenylephrine, or Afrin nasal spray as all of these can increase blood pressure and potentially lead to strokes. Do not use Afrin nasal spray longer than 3 days, as it can make nasal congestion worse if used too long.     For children over the age of 6 you may use Delsym from over the counter.    Zyrtec or Claritin is also beneficial for allergy symptoms including nasal congestion    For nasal congestion at night, or if you have high blood pressure and can’t take Sudafed or Afrin, use decongestants that come with an antihistamine (such as Coricidan HBP Cold and Flu or Benadryl). These can sometimes make you sleepy, so be sure to see how your body reacts to the drug before driving or going to work. Saline nasal sprays may also be helpful. Many patients find the Neti-pot saline rinse to be useful in clearing out their sinuses as well.    3. *NOTE OF CAUTION: Many over the counter cold preparations also contain Tylenol/acetaminophen. Be sure to check cold remedy labels so that you do not take too much Tylenol by mistake. Maximum is 3,000 mg per 24 hours.     Influenza is a viral illness. Generally this infection has to run it's course. It can take 7-14 days for a viral illness to pass.  Please treat your symptoms with over the counter medication. If your test was positive, treatment with Tamiflu or Xofluza was considered. You have to meet specific criteria in order to be eligible for this treatment. If this medication was prescribed for you, please start on it immediately. If your symptoms have been present more than 2-3 days it may not be effective at helping you through your illness.     COVID-19 is another viral infection that we now combate within our society. This infection has shown to cause many health complications. If you were COVID positive there is additional therapies you may take to help with symptoms. These additional therapies were considered during your office visit today. Please seek re-evaluation immediately if you develop concerns for pneumonia or other secondary complication from this infection.    If you have a personal or family history of blood clots (DVT), pulumonary embolism (PE), stroke, or heart attack or are at high risk for any of these complications then please talk with your doctor or nurse practitioner about starting on a baby aspirin (81 mg tablet). These have shown to be beneficial to prevent the above complications from occurring or recurring.    If you develop gastritis (irritation of the stomach) including heartburn or indigestion then over the counter PEPCID may be beneficial. Take this medication as directed on the package.    If you develop loss of taste or smell, peppermint candy or the use of peppermint essential oil in a home diffuser for aromatic purposes may be beneficial. Please remember everything in life has risks and benefits. Neither of these are without risks and you must consider your own health conditions and home situation. Do not diffuse peppermint essential oil in a closed room where pets are unable to escape.    Generally a viral illness is worse the first 3-5 days. Over the counter medication and home therapy will help with your symptoms.  Management of your illness at home for a few days will provide us with additional information on how to help you manage your illness. Antibiotics will only be beneficial if your symptoms are caused by a bacteria.     If your symptoms do not improve in 7-10 days or your symptoms become worse despite the recommendations above then please seek re-evaluation.    When this treatment fails or symptoms continue to worsen despite this treatment, re-evaluation is advised as your viral infection or allergy flare up can turn into a secondary bacterial infection where antibiotics would be recommended    Return Precautions    Although you are being discharged from the ED today, I encourage you to return for worsening symptoms.  Things can, and do, change such that treatment at home with medication may not be adequate.      Specifically, return for any of the following:    Chest pain, shortness of breath, pain or nausea and vomiting not controlled by medications provided.    Please make a follow up with your Primary Care Provider for a blood pressure recheck.

## 2024-11-11 ENCOUNTER — HOSPITAL ENCOUNTER (EMERGENCY)
Facility: HOSPITAL | Age: 29
Discharge: HOME | End: 2024-11-12
Admitting: EMERGENCY MEDICINE
Payer: MEDICAID

## 2024-11-11 DIAGNOSIS — N93.9 VAGINAL BLEEDING: Primary | ICD-10-CM

## 2024-11-11 DIAGNOSIS — N83.201 CYST OF RIGHT OVARY: ICD-10-CM

## 2024-11-11 LAB
ABO GROUP BLD: NORMAL
ALBUMIN SERPL-MCNC: 4 G/DL (ref 3.5–5.2)
ALBUMIN/GLOB SERPL: 1.3 G/DL
ALP SERPL-CCNC: 98 U/L (ref 39–117)
ALT SERPL W P-5'-P-CCNC: 20 U/L (ref 1–33)
ANION GAP SERPL CALCULATED.3IONS-SCNC: 8.7 MMOL/L (ref 5–15)
AST SERPL-CCNC: 17 U/L (ref 1–32)
BASOPHILS # BLD AUTO: 0.05 10*3/MM3 (ref 0–0.2)
BASOPHILS NFR BLD AUTO: 0.5 % (ref 0–1.5)
BILIRUB SERPL-MCNC: 0.5 MG/DL (ref 0–1.2)
BUN SERPL-MCNC: 7 MG/DL (ref 6–20)
BUN/CREAT SERPL: 13.2 (ref 7–25)
CALCIUM SPEC-SCNC: 9 MG/DL (ref 8.6–10.5)
CHLORIDE SERPL-SCNC: 107 MMOL/L (ref 98–107)
CO2 SERPL-SCNC: 26.3 MMOL/L (ref 22–29)
CREAT SERPL-MCNC: 0.53 MG/DL (ref 0.57–1)
DEPRECATED RDW RBC AUTO: 40.6 FL (ref 37–54)
EGFRCR SERPLBLD CKD-EPI 2021: 129.4 ML/MIN/1.73
EOSINOPHIL # BLD AUTO: 0.1 10*3/MM3 (ref 0–0.4)
EOSINOPHIL NFR BLD AUTO: 0.9 % (ref 0.3–6.2)
ERYTHROCYTE [DISTWIDTH] IN BLOOD BY AUTOMATED COUNT: 12.9 % (ref 12.3–15.4)
GLOBULIN UR ELPH-MCNC: 3.2 GM/DL
GLUCOSE SERPL-MCNC: 92 MG/DL (ref 65–99)
HCG INTACT+B SERPL-ACNC: <1 MIU/ML
HCT VFR BLD AUTO: 35.9 % (ref 34–46.6)
HGB BLD-MCNC: 11.8 G/DL (ref 12–15.9)
IMM GRANULOCYTES # BLD AUTO: 0.04 10*3/MM3 (ref 0–0.05)
IMM GRANULOCYTES NFR BLD AUTO: 0.4 % (ref 0–0.5)
LYMPHOCYTES # BLD AUTO: 3.24 10*3/MM3 (ref 0.7–3.1)
LYMPHOCYTES NFR BLD AUTO: 30.5 % (ref 19.6–45.3)
MCH RBC QN AUTO: 28.4 PG (ref 26.6–33)
MCHC RBC AUTO-ENTMCNC: 32.9 G/DL (ref 31.5–35.7)
MCV RBC AUTO: 86.5 FL (ref 79–97)
MONOCYTES # BLD AUTO: 0.81 10*3/MM3 (ref 0.1–0.9)
MONOCYTES NFR BLD AUTO: 7.6 % (ref 5–12)
NEUTROPHILS NFR BLD AUTO: 6.38 10*3/MM3 (ref 1.7–7)
NEUTROPHILS NFR BLD AUTO: 60.1 % (ref 42.7–76)
NRBC BLD AUTO-RTO: 0 /100 WBC (ref 0–0.2)
NUMBER OF DOSES: NORMAL
PLATELET # BLD AUTO: 273 10*3/MM3 (ref 140–450)
PMV BLD AUTO: 10.4 FL (ref 6–12)
POTASSIUM SERPL-SCNC: 3.6 MMOL/L (ref 3.5–5.2)
PROT SERPL-MCNC: 7.2 G/DL (ref 6–8.5)
RBC # BLD AUTO: 4.15 10*6/MM3 (ref 3.77–5.28)
RH BLD: POSITIVE
SODIUM SERPL-SCNC: 142 MMOL/L (ref 136–145)
WBC NRBC COR # BLD AUTO: 10.62 10*3/MM3 (ref 3.4–10.8)

## 2024-11-11 PROCEDURE — 86900 BLOOD TYPING SEROLOGIC ABO: CPT | Performed by: NURSE PRACTITIONER

## 2024-11-11 PROCEDURE — 80053 COMPREHEN METABOLIC PANEL: CPT | Performed by: NURSE PRACTITIONER

## 2024-11-11 PROCEDURE — 84702 CHORIONIC GONADOTROPIN TEST: CPT | Performed by: NURSE PRACTITIONER

## 2024-11-11 PROCEDURE — 99284 EMERGENCY DEPT VISIT MOD MDM: CPT

## 2024-11-11 PROCEDURE — 85025 COMPLETE CBC W/AUTO DIFF WBC: CPT | Performed by: NURSE PRACTITIONER

## 2024-11-11 PROCEDURE — 86901 BLOOD TYPING SEROLOGIC RH(D): CPT | Performed by: NURSE PRACTITIONER

## 2024-11-11 RX ORDER — ACETAMINOPHEN 500 MG
1000 TABLET ORAL ONCE
Status: COMPLETED | OUTPATIENT
Start: 2024-11-11 | End: 2024-11-11

## 2024-11-11 RX ADMIN — ACETAMINOPHEN 1000 MG: 500 TABLET, FILM COATED ORAL at 23:45

## 2024-11-12 ENCOUNTER — APPOINTMENT (OUTPATIENT)
Dept: ULTRASOUND IMAGING | Facility: HOSPITAL | Age: 29
End: 2024-11-12
Payer: MEDICAID

## 2024-11-12 VITALS
HEIGHT: 63 IN | RESPIRATION RATE: 11 BRPM | OXYGEN SATURATION: 98 % | BODY MASS INDEX: 42.3 KG/M2 | HEART RATE: 79 BPM | DIASTOLIC BLOOD PRESSURE: 67 MMHG | TEMPERATURE: 98.4 F | SYSTOLIC BLOOD PRESSURE: 107 MMHG | WEIGHT: 238.76 LBS

## 2024-11-12 PROCEDURE — 76830 TRANSVAGINAL US NON-OB: CPT

## 2024-11-12 PROCEDURE — 93976 VASCULAR STUDY: CPT

## 2024-11-12 PROCEDURE — 76856 US EXAM PELVIC COMPLETE: CPT

## 2024-11-12 NOTE — DISCHARGE INSTRUCTIONS
Return to ED for new or worsening symptoms: Bleeding more than 1 pad per hour, fever or any further concern  Please follow-up with OB/GYN, call for appointment

## 2024-11-12 NOTE — ED PROVIDER NOTES
Subjective   Chief Complaint   Patient presents with    Abdominal Pain    Vaginal Bleeding - Pregnant       History of Present Illness  Patient is a 29-year-old female G6,  presents to the emergency department for evaluation of vaginal bleeding, lower abdominal pain in pregnancy.  Patient reports she started having spotting, then progressed to worsening of bleeding as well as passing clots or tissue today.  Patient reports she has had similar symptoms with a miscarriage in the past.  Does report lower abdominal pain, initially worse on the left, now also on the right.  No fevers or chills.  No STD concerns.  No dysuria        History provided by:  Patient   used: Yes ([id number])        Review of Systems    No past medical history on file.    Allergies   Allergen Reactions    Penicillins Hives    Amoxicillin Unknown - High Severity       No past surgical history on file.    No family history on file.    Social History     Socioeconomic History    Marital status: Single   Tobacco Use    Smoking status: Never   Substance and Sexual Activity    Alcohol use: Yes     Comment: occ    Drug use: Not Currently           Objective   Physical Exam  Vitals and nursing note reviewed.   Constitutional:       Appearance: She is not toxic-appearing.   HENT:      Head: Normocephalic and atraumatic.      Mouth/Throat:      Mouth: Mucous membranes are moist.      Pharynx: Oropharynx is clear.   Eyes:      Extraocular Movements: Extraocular movements intact.      Pupils: Pupils are equal, round, and reactive to light.   Cardiovascular:      Rate and Rhythm: Normal rate and regular rhythm.      Heart sounds: Normal heart sounds. No murmur heard.     No friction rub. No gallop.   Pulmonary:      Effort: Pulmonary effort is normal.      Breath sounds: Normal breath sounds.   Abdominal:      General: Abdomen is flat. Bowel sounds are normal. There is no distension or abdominal bruit. There are no signs of injury.       Palpations: Abdomen is soft.      Tenderness: There is abdominal tenderness in the right lower quadrant, suprapubic area and left lower quadrant. There is no guarding or rebound.   Genitourinary:     Comments: She was placed in the lithotomy position external genitalia were found to have no lesions or swelling.  Speculum exam shows closed cervix, small amount of blood noted in vaginal vault .The patient had no cervical motion tenderness.  Patient had no adnexal tenderness.  The patient had cultures obtained and her exam was performed with magali CARBALLO at the bedside.  Skin:     General: Skin is warm and dry.      Capillary Refill: Capillary refill takes less than 2 seconds.   Neurological:      General: No focal deficit present.      Mental Status: She is alert and oriented to person, place, and time.         Procedures           ED Course  ED Course as of 11/12/24 0438   Mon Nov 11, 2024   2233 HCG Quantitative: <1.00 [LB]   2358 HCG Quantitative: <1.00 [LB]      ED Course User Index  [LB] Becka Barba, AYANA                    No data recorded                             Medical Decision Making  Utilized for all patient interactions.  Discharge  [id number]  Chart Review: [facility] ED note 8/27/2024  Pt was Placed on appropriate monitoring.  Differential diagnoses considered for patient presentation, this list is not all inclusive of diagnoses considered: Subchronic hemorrhage, miscarriage, ectopic pregnancy  Patient presents to the ED for the above complaint, underwent the above, exam and workup.  Patient's pregnancy test here is negative with hCG being less than 1.00.  Hemoglobin hematocrit are stable.  Ultrasound obtained no findings concerning for ectopic pregnancy.  She does have a right ovarian cyst which I discussed with the patient.  No hemorrhage noted on exam.  I do feel patient be discharged from the ED and continue outpatient follow-up with OB/GYN.  Considertion was given for  admission, however patient has reassuring exam and workup in the ed and appears appropriate for discharge home and continued outpatient care.     We discussed my findings, plan of care, discharge instructions, the importance of follow up with their PCP/ and or specialist for repeat evaluation and to discuss any abnormal findings in labs or imaging that warrant further outpatient evaluation. We discussed that although a definitive diagnosis is not always found in the ED, it is believed emergent conditions have been ruled out, and patient is safe for discharge at this time.  We discussed return precautions for the emergency department.  Patient verbalizes understandings, and agrees with current plan of care.      Note Disclaimer: At [facility], we believe that sharing information builds trust and better relationships. You are receiving this note because you recently visited [facility]. It is possible you will see health information before a provider has talked with you about it. This kind of information can be easy to misunderstand. To help you fully understand what it means for your health, we urge you to discuss this note with your provider  Note dictated utilizing Dragon Dictation.  Appropriate PPE worn during patient interactions.        Final diagnoses:   Vaginal bleeding   Cyst of right ovary       ED Disposition  ED Disposition       ED Disposition   Discharge    Condition   Stable    Comment   --               PATIENT CONNECTION - [facility] [address] [phone]  Schedule an appointment as soon as possible for a visit   Call for assistance with follow up with Primary care provider-call tomorrow.    [facility] EMERGENCY DEPARTMENT  [address] [facility] [address] [phone]             Medication List      No changes were made to your prescriptions during this visit.            Becka Barba, APRN  11/12/24 0439

## 2025-01-21 ENCOUNTER — HOSPITAL ENCOUNTER (EMERGENCY)
Facility: HOSPITAL | Age: 30
Discharge: HOME | End: 2025-01-21
Attending: EMERGENCY MEDICINE | Admitting: EMERGENCY MEDICINE

## 2025-01-21 VITALS
HEART RATE: 91 BPM | TEMPERATURE: 98 F | SYSTOLIC BLOOD PRESSURE: 118 MMHG | BODY MASS INDEX: 42.52 KG/M2 | WEIGHT: 240 LBS | HEIGHT: 63 IN | DIASTOLIC BLOOD PRESSURE: 70 MMHG | RESPIRATION RATE: 20 BRPM | OXYGEN SATURATION: 96 %

## 2025-01-21 DIAGNOSIS — F41.0 PANIC ATTACK: Primary | ICD-10-CM

## 2025-01-21 LAB
ANION GAP SERPL CALCULATED.3IONS-SCNC: 10.5 MMOL/L (ref 5–15)
BASOPHILS # BLD AUTO: 0.05 10*3/MM3 (ref 0–0.2)
BASOPHILS NFR BLD AUTO: 0.4 % (ref 0–1.5)
BUN SERPL-MCNC: 9 MG/DL (ref 6–20)
BUN/CREAT SERPL: 17.6 (ref 7–25)
CALCIUM SPEC-SCNC: 9.2 MG/DL (ref 8.6–10.5)
CHLORIDE SERPL-SCNC: 104 MMOL/L (ref 98–107)
CO2 SERPL-SCNC: 24.5 MMOL/L (ref 22–29)
CREAT SERPL-MCNC: 0.51 MG/DL (ref 0.57–1)
DEPRECATED RDW RBC AUTO: 40.6 FL (ref 37–54)
EGFRCR SERPLBLD CKD-EPI 2021: 130.6 ML/MIN/1.73
EOSINOPHIL # BLD AUTO: 0.06 10*3/MM3 (ref 0–0.4)
EOSINOPHIL NFR BLD AUTO: 0.5 % (ref 0.3–6.2)
ERYTHROCYTE [DISTWIDTH] IN BLOOD BY AUTOMATED COUNT: 12.8 % (ref 12.3–15.4)
GLUCOSE SERPL-MCNC: 104 MG/DL (ref 65–99)
HCG SERPL QL: NEGATIVE
HCT VFR BLD AUTO: 36.1 % (ref 34–46.6)
HGB BLD-MCNC: 11.6 G/DL (ref 12–15.9)
IMM GRANULOCYTES # BLD AUTO: 0.07 10*3/MM3 (ref 0–0.05)
IMM GRANULOCYTES NFR BLD AUTO: 0.6 % (ref 0–0.5)
LYMPHOCYTES # BLD AUTO: 1.81 10*3/MM3 (ref 0.7–3.1)
LYMPHOCYTES NFR BLD AUTO: 15.1 % (ref 19.6–45.3)
MCH RBC QN AUTO: 27.9 PG (ref 26.6–33)
MCHC RBC AUTO-ENTMCNC: 32.1 G/DL (ref 31.5–35.7)
MCV RBC AUTO: 86.8 FL (ref 79–97)
MONOCYTES # BLD AUTO: 0.74 10*3/MM3 (ref 0.1–0.9)
MONOCYTES NFR BLD AUTO: 6.2 % (ref 5–12)
NEUTROPHILS NFR BLD AUTO: 77.2 % (ref 42.7–76)
NEUTROPHILS NFR BLD AUTO: 9.27 10*3/MM3 (ref 1.7–7)
NRBC BLD AUTO-RTO: 0 /100 WBC (ref 0–0.2)
PLATELET # BLD AUTO: 306 10*3/MM3 (ref 140–450)
PMV BLD AUTO: 10.4 FL (ref 6–12)
POTASSIUM SERPL-SCNC: 3.7 MMOL/L (ref 3.5–5.2)
QT INTERVAL: 345 MS
QTC INTERVAL: 433 MS
RBC # BLD AUTO: 4.16 10*6/MM3 (ref 3.77–5.28)
SODIUM SERPL-SCNC: 139 MMOL/L (ref 136–145)
WBC NRBC COR # BLD AUTO: 12 10*3/MM3 (ref 3.4–10.8)

## 2025-01-21 PROCEDURE — 80048 BASIC METABOLIC PNL TOTAL CA: CPT | Performed by: EMERGENCY MEDICINE

## 2025-01-21 PROCEDURE — 96374 THER/PROPH/DIAG INJ IV PUSH: CPT

## 2025-01-21 PROCEDURE — 85025 COMPLETE CBC W/AUTO DIFF WBC: CPT | Performed by: EMERGENCY MEDICINE

## 2025-01-21 PROCEDURE — 84703 CHORIONIC GONADOTROPIN ASSAY: CPT | Performed by: EMERGENCY MEDICINE

## 2025-01-21 PROCEDURE — 93005 ELECTROCARDIOGRAM TRACING: CPT | Performed by: EMERGENCY MEDICINE

## 2025-01-21 PROCEDURE — 25010000002 LORAZEPAM PER 2 MG: Performed by: EMERGENCY MEDICINE

## 2025-01-21 PROCEDURE — 99283 EMERGENCY DEPT VISIT LOW MDM: CPT

## 2025-01-21 RX ORDER — LORAZEPAM 2 MG/ML
1 INJECTION INTRAMUSCULAR ONCE
Status: COMPLETED | OUTPATIENT
Start: 2025-01-21 | End: 2025-01-21

## 2025-01-21 RX ORDER — SODIUM CHLORIDE 0.9 % (FLUSH) 0.9 %
10 SYRINGE (ML) INJECTION AS NEEDED
Status: DISCONTINUED | OUTPATIENT
Start: 2025-01-21 | End: 2025-01-21 | Stop reason: HOSPADM

## 2025-01-21 RX ADMIN — LORAZEPAM 1 MG: 2 INJECTION INTRAMUSCULAR; INTRAVENOUS at 03:04

## 2025-01-21 NOTE — DISCHARGE INSTRUCTIONS
Rest, call for primary care follow-up.  Return for increased shortness of breath, increased numbness, weakness or any other concern

## 2025-01-21 NOTE — ED PROVIDER NOTES
Subjective   History of Present Illness  29-year-old  female states that she was feeling nervous and anxious at work last night as a .  She states she was under some increased stress at work.  States that feeling continued at home and she started feeling shaky and numb and tingly all over.  She reports no chest pain or palpitations or syncope or headache or focal numbness or weakness.  Review of Systems    No past medical history on file.  Reportedly miscarriage in November  Allergies   Allergen Reactions    Penicillins Hives    Amoxicillin Unknown - High Severity       No past surgical history on file.    No family history on file.    Social History     Socioeconomic History    Marital status: Single   Tobacco Use    Smoking status: Never   Substance and Sexual Activity    Alcohol use: Yes     Comment: occ    Drug use: Not Currently       Prior to Admission medications    Medication Sig Start Date End Date Taking? Authorizing Provider   albuterol (PROVENTIL) (2.5 MG/3ML) 0.083% nebulizer solution Inhale 2.5 mg Every 4 (Four) Hours As Needed. 6/6/23   Provider, MD Simona   albuterol sulfate  (90 Base) MCG/ACT inhaler Inhale 2 puffs Every 4 (Four) Hours As Needed for Shortness of Air or Wheezing. 8/27/24   Audrey Warren APRN   azithromycin (Zithromax Z-Fadi) 250 MG tablet Take 2 tablets by mouth on day 1, then 1 tablet daily on days 2-5 1/16/24   Stefan Rivera PA   doxycycline (MONODOX) 100 MG capsule Take 1 capsule by mouth 2 (Two) Times a Day. 8/22/23   Sp Butterfield MD   levocetirizine (XYZAL) 5 MG tablet Take 1 tablet by mouth Every Evening for 30 days. 8/27/24 9/26/24  Audrey Warren APRN   meloxicam (Mobic) 15 MG tablet Take 1 tablet by mouth Daily. Prn pain 8/22/23   Sp Butterfield MD   methylPREDNISolone (MEDROL) 4 MG dose pack Take as directed on package instructions. 1/16/24   Stefan Rivera PA   ondansetron ODT (ZOFRAN-ODT) 4 MG disintegrating tablet Place 1  "tablet on the tongue Every 8 (Eight) Hours As Needed for Vomiting. 4/4/24   Milan Bhatti MD   traMADol (ULTRAM) 50 MG tablet Take 1 tablet by mouth Every 6 (Six) Hours As Needed for Severe Pain. 4/4/24   Milan Bhatti MD     /69   Pulse 98   Temp 98 °F (36.7 °C) (Oral)   Resp 20   Ht 160 cm (63\")   Wt 109 kg (240 lb)   LMP 09/24/2024 (Exact Date)   SpO2 100%   BMI 42.51 kg/m²       Objective   Physical Exam  General: Well-developed well-appearing, no acute distress, alert and appropriate  Eyes: sclera nonicteric  HEENT: Mucous membranes moist, no mucosal swelling  Neck: Supple, no nuchal rigidity, no JVD  Respirations: Respirations nonlabored, equal breath sounds bilaterally, clear lungs  Heart regular rate and rhythm, no murmurs rubs or gallops,   Abdomen soft nontender nondistended, no hepatosplenomegaly, no hernia, no mass, normal bowel sounds, no CVA tenderness  Extremities no clubbing cyanosis or edema, calves are symmetric and nontender  Neuro cranial nerves grossly intact, no focal limb deficits, no ataxia, normal gait and station  Psych oriented, pleasant affect, mildly tremulous  Skin no rash, brisk cap refill  Procedures           ED Course  Results for orders placed or performed during the hospital encounter of 01/21/25   ECG 12 Lead Altered Mental Status    Collection Time: 01/21/25  2:52 AM   Result Value Ref Range    QT Interval 345 ms    QTC Interval 433 ms   Basic Metabolic Panel    Collection Time: 01/21/25  2:58 AM    Specimen: Blood   Result Value Ref Range    Glucose 104 (H) 65 - 99 mg/dL    BUN 9 6 - 20 mg/dL    Creatinine 0.51 (L) 0.57 - 1.00 mg/dL    Sodium 139 136 - 145 mmol/L    Potassium 3.7 3.5 - 5.2 mmol/L    Chloride 104 98 - 107 mmol/L    CO2 24.5 22.0 - 29.0 mmol/L    Calcium 9.2 8.6 - 10.5 mg/dL    BUN/Creatinine Ratio 17.6 7.0 - 25.0    Anion Gap 10.5 5.0 - 15.0 mmol/L    eGFR 130.6 >60.0 mL/min/1.73   hCG, Serum, Qualitative    Collection Time: 01/21/25  2:58 AM    " Specimen: Blood   Result Value Ref Range    HCG Qualitative Negative Negative   CBC Auto Differential    Collection Time: 01/21/25  2:58 AM    Specimen: Blood   Result Value Ref Range    WBC 12.00 (H) 3.40 - 10.80 10*3/mm3    RBC 4.16 3.77 - 5.28 10*6/mm3    Hemoglobin 11.6 (L) 12.0 - 15.9 g/dL    Hematocrit 36.1 34.0 - 46.6 %    MCV 86.8 79.0 - 97.0 fL    MCH 27.9 26.6 - 33.0 pg    MCHC 32.1 31.5 - 35.7 g/dL    RDW 12.8 12.3 - 15.4 %    RDW-SD 40.6 37.0 - 54.0 fl    MPV 10.4 6.0 - 12.0 fL    Platelets 306 140 - 450 10*3/mm3    Neutrophil % 77.2 (H) 42.7 - 76.0 %    Lymphocyte % 15.1 (L) 19.6 - 45.3 %    Monocyte % 6.2 5.0 - 12.0 %    Eosinophil % 0.5 0.3 - 6.2 %    Basophil % 0.4 0.0 - 1.5 %    Immature Grans % 0.6 (H) 0.0 - 0.5 %    Neutrophils, Absolute 9.27 (H) 1.70 - 7.00 10*3/mm3    Lymphocytes, Absolute 1.81 0.70 - 3.10 10*3/mm3    Monocytes, Absolute 0.74 0.10 - 0.90 10*3/mm3    Eosinophils, Absolute 0.06 0.00 - 0.40 10*3/mm3    Basophils, Absolute 0.05 0.00 - 0.20 10*3/mm3    Immature Grans, Absolute 0.07 (H) 0.00 - 0.05 10*3/mm3    nRBC 0.0 0.0 - 0.2 /100 WBC       ED Course as of 01/21/25 0357   Tue Jan 21, 2025   0352 On reexamination patient states she is feeling much better.  She appears calm.  A repeat neurologic exam was normal. [SH]      ED Course User Index  [SH] Uriah Jeff MD                                                       Medical Decision Making  Differential diagnosis including anxiety, acute coronary syndrome, CVA, seizure, pulmonary embolus,    PERC score 0.  Patient is not hypoxic, pulmonary embolus felt to be unlikely.  Patient has a normal EKG and no chest pain or dyspnea at this time, acute coronary syndrome felt to be unlikely.  She has a normal neurologic exam no sign of CVA or seizure.  Anxiety was favored based on her description of the event and feeling of increased stress.  She was ordered Ativan during the emergency room course and was feeling much better on  reexamination.  She is discharged in good condition was given warning signs for return.  She is also referred for primary care follow-up.    Problems Addressed:  Panic attack: complicated acute illness or injury    Amount and/or Complexity of Data Reviewed  Labs: ordered. Decision-making details documented in ED Course.     Details: CBC shows borderline leukocytosis is nonspecific, hCG negative, Chem-7 essentially normal  ECG/medicine tests: ordered and independent interpretation performed.     Details: My EKG interpretation sinus rhythm, rate of 95, no acute ST or T wave abnormalities    Risk  Prescription drug management.        Final diagnoses:   Panic attack       ED Disposition  ED Disposition       ED Disposition   Discharge    Condition   Stable    Comment   --               PATIENT CONNECTION - [facility] [address]  [phone]  Schedule an appointment as soon as possible for a visit in 2 days           Medication List      No changes were made to your prescriptions during this visit.            Uriah Jeff MD  01/21/25 0357

## 2025-04-09 ENCOUNTER — APPOINTMENT (OUTPATIENT)
Dept: ULTRASOUND IMAGING | Facility: HOSPITAL | Age: 30
End: 2025-04-09

## 2025-04-09 ENCOUNTER — APPOINTMENT (OUTPATIENT)
Dept: ULTRASOUND IMAGING | Facility: HOSPITAL | Age: 30
End: 2025-04-09
Payer: MEDICAID

## 2025-04-09 ENCOUNTER — HOSPITAL ENCOUNTER (EMERGENCY)
Facility: HOSPITAL | Age: 30
Discharge: HOME | End: 2025-04-09
Admitting: EMERGENCY MEDICINE
Payer: MEDICAID

## 2025-04-09 VITALS
TEMPERATURE: 98.1 F | WEIGHT: 239.2 LBS | OXYGEN SATURATION: 98 % | SYSTOLIC BLOOD PRESSURE: 119 MMHG | RESPIRATION RATE: 18 BRPM | HEIGHT: 63 IN | DIASTOLIC BLOOD PRESSURE: 63 MMHG | HEART RATE: 85 BPM | BODY MASS INDEX: 42.38 KG/M2

## 2025-04-09 DIAGNOSIS — Z3A.01 LESS THAN 8 WEEKS GESTATION OF PREGNANCY: Primary | ICD-10-CM

## 2025-04-09 DIAGNOSIS — N83.202 CYST OF LEFT OVARY: ICD-10-CM

## 2025-04-09 DIAGNOSIS — R31.21 ASYMPTOMATIC MICROSCOPIC HEMATURIA: ICD-10-CM

## 2025-04-09 LAB
BACTERIA UR QL AUTO: ABNORMAL /HPF
BILIRUB UR QL STRIP: NEGATIVE
CLARITY UR: CLEAR
COLOR UR: YELLOW
GLUCOSE UR STRIP-MCNC: NEGATIVE MG/DL
HCG INTACT+B SERPL-ACNC: 115 MIU/ML
HGB UR QL STRIP.AUTO: ABNORMAL
HYALINE CASTS UR QL AUTO: ABNORMAL /LPF
KETONES UR QL STRIP: NEGATIVE
LEUKOCYTE ESTERASE UR QL STRIP.AUTO: NEGATIVE
NITRITE UR QL STRIP: NEGATIVE
PH UR STRIP.AUTO: 6 [PH] (ref 5–8)
PROT UR QL STRIP: NEGATIVE
RBC # UR STRIP: ABNORMAL /HPF
REF LAB TEST METHOD: ABNORMAL
SP GR UR STRIP: 1.02 (ref 1–1.03)
SQUAMOUS #/AREA URNS HPF: ABNORMAL /HPF
UROBILINOGEN UR QL STRIP: ABNORMAL
WBC # UR STRIP: ABNORMAL /HPF

## 2025-04-09 PROCEDURE — 99284 EMERGENCY DEPT VISIT MOD MDM: CPT

## 2025-04-09 PROCEDURE — 93976 VASCULAR STUDY: CPT

## 2025-04-09 PROCEDURE — 81001 URINALYSIS AUTO W/SCOPE: CPT | Performed by: OCCUPATIONAL THERAPIST

## 2025-04-09 PROCEDURE — 76817 TRANSVAGINAL US OBSTETRIC: CPT

## 2025-04-09 PROCEDURE — 84702 CHORIONIC GONADOTROPIN TEST: CPT | Performed by: OCCUPATIONAL THERAPIST

## 2025-04-09 PROCEDURE — 76801 OB US < 14 WKS SINGLE FETUS: CPT

## 2025-04-09 PROCEDURE — 36415 COLL VENOUS BLD VENIPUNCTURE: CPT

## 2025-04-09 NOTE — ED NOTES
Pt reports LMP was 3/8-3/15. Pt reports possible pregnancy, took at home test yesterday with positive results. Pt c/o intermittent low abdominal cramping, denies any vaginal bleeding/discharge, no n/v.

## 2025-04-09 NOTE — ED PROVIDER NOTES
Subjective   History of Present Illness  Patient is a 29-year-old G5,  with no significant past medical history presenting to the ED for evaluation of possible pregnancy.  She reports that she has taken several home tests and they were all positive.  Patient reports that she had 1 miscarriage and has had no other pregnancy related complications.  Patient reports that she has had some cramping in her suprapubic region for 2 days.  She has had increased urinary frequency.  She denies vaginal bleeding, discharge, fever, cough, change in sexual partner, dysuria, nausea, vomiting, diarrhea.  She is not having any abdominal pain.  No fever, dyspareunia, or bodyaches.  She had pain pelvic exam on the first of the month at [facility] that was normal.        Review of Systems   Constitutional:  Negative for activity change, fatigue and fever.       No past medical history on file.    Allergies   Allergen Reactions    Penicillins Hives    Amoxicillin Unknown - High Severity       No past surgical history on file.    No family history on file.    Social History     Socioeconomic History    Marital status: Single   Tobacco Use    Smoking status: Never   Substance and Sexual Activity    Alcohol use: Yes     Comment: occ    Drug use: Not Currently           Objective   Physical Exam  Vitals and nursing note reviewed.   Constitutional:       General: She is not in acute distress.     Appearance: Normal appearance. She is not ill-appearing, toxic-appearing or diaphoretic.   HENT:      Head: Normocephalic.      Right Ear: External ear normal.      Nose: Nose normal.      Mouth/Throat:      Mouth: Mucous membranes are moist.   Eyes:      Extraocular Movements: Extraocular movements intact.      Conjunctiva/sclera: Conjunctivae normal.      Pupils: Pupils are equal, round, and reactive to light.   Cardiovascular:      Rate and Rhythm: Normal rate and regular rhythm.   Pulmonary:      Effort: Pulmonary effort is normal.       Breath sounds: Normal breath sounds.   Abdominal:      General: Bowel sounds are normal. There is no distension.      Palpations: Abdomen is soft.      Tenderness: There is no abdominal tenderness. There is no guarding or rebound.   Musculoskeletal:         General: Normal range of motion.      Cervical back: Normal range of motion and neck supple.      Right lower leg: No edema.      Left lower leg: No edema.   Skin:     General: Skin is warm and dry.      Capillary Refill: Capillary refill takes less than 2 seconds.      Coloration: Skin is not jaundiced.      Findings: No bruising, erythema or rash.   Neurological:      General: No focal deficit present.      Mental Status: She is alert.   Psychiatric:         Mood and Affect: Mood normal.         Behavior: Behavior normal.         Procedures           ED Course        Labs Reviewed   URINALYSIS W/ CULTURE IF INDICATED - Abnormal; Notable for the following components:       Result Value    Blood, UA Moderate (2+) (*)     All other components within normal limits    Narrative:     In absence of clinical symptoms, the presence of pyuria, bacteria, and/or nitrites on the urinalysis result does not correlate with infection.   URINALYSIS, MICROSCOPIC ONLY - Abnormal; Notable for the following components:    RBC, UA 6-10 (*)     All other components within normal limits   HCG, QUANTITATIVE, PREGNANCY    Narrative:     HCG Ranges by Gestational Age    Females - non-pregnant premenopausal   </= 1mIU/mL HCG  Females - postmenopausal               </= 7mIU/mL HCG    3 Weeks       5.4   -      72 mIU/mL  4 Weeks      10.2   -     708 mIU/mL  5 Weeks       217   -   8,245 mIU/mL  6 Weeks       152   -  32,177 mIU/mL  7 Weeks     4,059   - 153,767 mIU/mL  8 Weeks    31,366   - 149,094 mIU/mL  9 Weeks    59,109   - 135,901 mIU/mL  10 Weeks   44,186   - 170,409 mIU/mL  12 Weeks   27,107   - 201,615 mIU/mL  14 Weeks   24,302   -  93,646 mIU/mL  15 Weeks   12,540   -  69,747  mIU/mL  16 Weeks    8,904   -  55,332 mIU/mL  17 Weeks    8,240   -  51,793 mIU/mL  18 Weeks    9,649   -  55,271 mIU/mL       US Ob Transvaginal  Result Date: 4/9/2025  Impression: 1. Small amount of fluid within the endometrial canal which is nonspecific. No evidence of the gestational sac is noted at this time. Early intrauterine pregnancy too small to visualize versus nonviable pregnancy are in the differential. Recommend continued follow-up as clinically indicated with serial beta hCG levels and/or follow-up pelvic ultrasound. 2. Small probable hemorrhagic cyst left ovary measuring 2.3 cm. Electronically Signed: Aniceto Magaña MD  4/9/2025 12:25 PM EDT  Workstation ID: [id number]    US Ob < 14 Weeks Single or First Gestation  Result Date: 4/9/2025  Impression: 1. Small amount of fluid within the endometrial canal which is nonspecific. No evidence of the gestational sac is noted at this time. Early intrauterine pregnancy too small to visualize versus nonviable pregnancy are in the differential. Recommend continued follow-up as clinically indicated with serial beta hCG levels and/or follow-up pelvic ultrasound. 2. Small probable hemorrhagic cyst left ovary measuring 2.3 cm. Electronically Signed: Aniceto Magaña MD  4/9/2025 12:25 PM EDT  Workstation ID: [id number]    Medications - No data to display                                                 Medical Decision Making  Patient is a 29-year-old female who presented with the above complaint.  She had the above evaluation and exam.    Imaging independently interpreted by radiology and reviewed by myself.  TVUS significant for small amount of fluid that was nonspecific within the endometrial canal.  Given patient's first day of last menstrual period, it is too soon to see gestational sac or other evidence of pregnancy.  She was noted to have a left ovarian cyst incidentally.  She is not having any pain on that side.    Urinalysis was significant for 2+ blood.   Discussed this with patient, who does not have any urinary complaints today.  She is not having any vaginal bleeding.  hCG quantitative 115.    Pelvic exam was offered to patient, but she declined.  She reports that had 1 completed at [facility] on the first of the month that was normal she does not want to do another 1 today.    At reassessment, patient resting comfortably no acute distress.  She is hemodynamically stable and afebrile.  She is not experiencing any pain or vaginal bleeding.  Discussed findings with patient, who verbalized understanding.  She was instructed to follow-up with OB/GYN for additional monitoring and return to the ER with new or worsening symptoms.  She verbalized agreement and understanding.    Amount and/or Complexity of Data Reviewed  Labs: ordered.  Radiology: ordered.        Final diagnoses:   Less than 8 weeks gestation of pregnancy   Cyst of left ovary   Asymptomatic microscopic hematuria       ED Disposition  ED Disposition       ED Disposition   Discharge    Condition   Stable    Comment   --               Jcarlos Crabtree MD  [address] [place] [address]  [phone]    Schedule an appointment as soon as possible for a visit   To establish care with a local OB/GYN         Medication List        Changed      albuterol (2.5 MG/3ML) 0.083% nebulizer solution  Commonly known as: PROVENTIL  What changed: Another medication with the same name was removed. Continue taking this medication, and follow the directions you see here.            Stop      azithromycin 250 MG tablet  Commonly known as: Zithromax Z-Fadi     doxycycline 100 MG capsule  Commonly known as: MONODOX     meloxicam 15 MG tablet  Commonly known as: Mobic     methylPREDNISolone 4 MG dose pack  Commonly known as: MEDROL     ondansetron ODT 4 MG disintegrating tablet  Commonly known as: ZOFRAN-ODT     traMADol 50 MG tablet  Commonly known as: Glenda Jung PA-C  04/09/25  150

## 2025-04-09 NOTE — Clinical Note
[facility] EMERGENCY DEPARTMENT  [address] [place] [address]  Phone: [phone]    Amira Maloney Oswald was seen and treated in our emergency department on 4/9/2025.  She may return to work on 04/10/2025.         Thank you for choosing [facility].    Glenda Smith PA-C

## 2025-04-18 ENCOUNTER — HOSPITAL ENCOUNTER (EMERGENCY)
Facility: HOSPITAL | Age: 30
Discharge: HOME | End: 2025-04-19
Attending: EMERGENCY MEDICINE
Payer: MEDICAID

## 2025-04-18 DIAGNOSIS — O20.0 THREATENED MISCARRIAGE: Primary | ICD-10-CM

## 2025-04-18 PROCEDURE — 85025 COMPLETE CBC W/AUTO DIFF WBC: CPT | Performed by: EMERGENCY MEDICINE

## 2025-04-18 PROCEDURE — 80048 BASIC METABOLIC PNL TOTAL CA: CPT | Performed by: EMERGENCY MEDICINE

## 2025-04-18 PROCEDURE — 99284 EMERGENCY DEPT VISIT MOD MDM: CPT

## 2025-04-18 PROCEDURE — 84702 CHORIONIC GONADOTROPIN TEST: CPT | Performed by: EMERGENCY MEDICINE

## 2025-04-18 NOTE — Clinical Note
[facility] EMERGENCY DEPARTMENT  [address] [place] [address]  Phone: [phone]    Amira Akers was seen and treated in our emergency department on 4/18/2025.  She may return to work on 04/23/2025.         Thank you for choosing [facility].    Alejandro Newberry MD

## 2025-04-18 NOTE — Clinical Note
[facility] EMERGENCY DEPARTMENT  [address] [place] [address]  Phone: [phone]    Amira Akers was seen and treated in our emergency department on 4/18/2025.  She may return to work on 04/23/2025.         Thank you for choosing [facility].    Alejandro Newberry MD

## 2025-04-19 ENCOUNTER — APPOINTMENT (OUTPATIENT)
Dept: ULTRASOUND IMAGING | Facility: HOSPITAL | Age: 30
End: 2025-04-19
Payer: MEDICAID

## 2025-04-19 VITALS
DIASTOLIC BLOOD PRESSURE: 76 MMHG | OXYGEN SATURATION: 99 % | HEIGHT: 63 IN | TEMPERATURE: 98.2 F | WEIGHT: 243.61 LBS | SYSTOLIC BLOOD PRESSURE: 118 MMHG | HEART RATE: 92 BPM | RESPIRATION RATE: 17 BRPM | BODY MASS INDEX: 43.16 KG/M2

## 2025-04-19 LAB
ANION GAP SERPL CALCULATED.3IONS-SCNC: 8.9 MMOL/L (ref 5–15)
BACTERIA UR QL AUTO: ABNORMAL /HPF
BASOPHILS # BLD AUTO: 0.04 10*3/MM3 (ref 0–0.2)
BASOPHILS NFR BLD AUTO: 0.3 % (ref 0–1.5)
BILIRUB UR QL STRIP: NEGATIVE
BUN SERPL-MCNC: 9 MG/DL (ref 6–20)
BUN/CREAT SERPL: 18.8 (ref 7–25)
CALCIUM SPEC-SCNC: 8.7 MG/DL (ref 8.6–10.5)
CHLORIDE SERPL-SCNC: 103 MMOL/L (ref 98–107)
CLARITY UR: CLEAR
CO2 SERPL-SCNC: 23.1 MMOL/L (ref 22–29)
COLOR UR: YELLOW
CREAT SERPL-MCNC: 0.48 MG/DL (ref 0.57–1)
DEPRECATED RDW RBC AUTO: 41.1 FL (ref 37–54)
EGFRCR SERPLBLD CKD-EPI 2021: 132.5 ML/MIN/1.73
EOSINOPHIL # BLD AUTO: 0.17 10*3/MM3 (ref 0–0.4)
EOSINOPHIL NFR BLD AUTO: 1.5 % (ref 0.3–6.2)
ERYTHROCYTE [DISTWIDTH] IN BLOOD BY AUTOMATED COUNT: 13.2 % (ref 12.3–15.4)
GLUCOSE SERPL-MCNC: 108 MG/DL (ref 65–99)
GLUCOSE UR STRIP-MCNC: NEGATIVE MG/DL
HCG INTACT+B SERPL-ACNC: 3833 MIU/ML
HCT VFR BLD AUTO: 31.7 % (ref 34–46.6)
HGB BLD-MCNC: 10.3 G/DL (ref 12–15.9)
HGB UR QL STRIP.AUTO: ABNORMAL
HYALINE CASTS UR QL AUTO: ABNORMAL /LPF
IMM GRANULOCYTES # BLD AUTO: 0.05 10*3/MM3 (ref 0–0.05)
IMM GRANULOCYTES NFR BLD AUTO: 0.4 % (ref 0–0.5)
KETONES UR QL STRIP: NEGATIVE
LEUKOCYTE ESTERASE UR QL STRIP.AUTO: ABNORMAL
LYMPHOCYTES # BLD AUTO: 3.03 10*3/MM3 (ref 0.7–3.1)
LYMPHOCYTES NFR BLD AUTO: 26.5 % (ref 19.6–45.3)
MCH RBC QN AUTO: 28 PG (ref 26.6–33)
MCHC RBC AUTO-ENTMCNC: 32.5 G/DL (ref 31.5–35.7)
MCV RBC AUTO: 86.1 FL (ref 79–97)
MONOCYTES # BLD AUTO: 0.9 10*3/MM3 (ref 0.1–0.9)
MONOCYTES NFR BLD AUTO: 7.9 % (ref 5–12)
NEUTROPHILS NFR BLD AUTO: 63.4 % (ref 42.7–76)
NEUTROPHILS NFR BLD AUTO: 7.26 10*3/MM3 (ref 1.7–7)
NITRITE UR QL STRIP: NEGATIVE
NRBC BLD AUTO-RTO: 0 /100 WBC (ref 0–0.2)
PH UR STRIP.AUTO: 6 [PH] (ref 5–8)
PLATELET # BLD AUTO: 263 10*3/MM3 (ref 140–450)
PMV BLD AUTO: 9.9 FL (ref 6–12)
POTASSIUM SERPL-SCNC: 3.4 MMOL/L (ref 3.5–5.2)
PROT UR QL STRIP: NEGATIVE
RBC # BLD AUTO: 3.68 10*6/MM3 (ref 3.77–5.28)
RBC # UR STRIP: ABNORMAL /HPF
REF LAB TEST METHOD: ABNORMAL
SODIUM SERPL-SCNC: 135 MMOL/L (ref 136–145)
SP GR UR STRIP: 1.02 (ref 1–1.03)
SQUAMOUS #/AREA URNS HPF: ABNORMAL /HPF
UROBILINOGEN UR QL STRIP: ABNORMAL
WBC # UR STRIP: ABNORMAL /HPF
WBC NRBC COR # BLD AUTO: 11.45 10*3/MM3 (ref 3.4–10.8)

## 2025-04-19 PROCEDURE — 76817 TRANSVAGINAL US OBSTETRIC: CPT

## 2025-04-19 PROCEDURE — 87086 URINE CULTURE/COLONY COUNT: CPT | Performed by: EMERGENCY MEDICINE

## 2025-04-19 PROCEDURE — 76801 OB US < 14 WKS SINGLE FETUS: CPT

## 2025-04-19 PROCEDURE — 81001 URINALYSIS AUTO W/SCOPE: CPT | Performed by: EMERGENCY MEDICINE

## 2025-04-19 PROCEDURE — 93976 VASCULAR STUDY: CPT

## 2025-04-19 RX ORDER — PNV NO.95/FERROUS FUM/FOLIC AC 28MG-0.8MG
1 TABLET ORAL DAILY
Qty: 90 TABLET | Refills: 3 | Status: SHIPPED | OUTPATIENT
Start: 2025-04-19

## 2025-04-19 NOTE — ED PROVIDER NOTES
Subjective   History of Present Illness  29-year-old female who is pregnant with last menstrual period of 3/15/2025 complains of mild suprapubic pain over the past 24 hours, no vaginal bleeding, no vomiting or diarrhea, no urinary symptoms scant discharge and only, no fever.      Review of Systems   Genitourinary:         As per HPI       No past medical history on file.    Allergies   Allergen Reactions    Penicillins Hives    Amoxicillin Unknown - High Severity       No past surgical history on file.    No family history on file.    Social History     Socioeconomic History    Marital status: Single   Tobacco Use    Smoking status: Never   Substance and Sexual Activity    Alcohol use: Yes     Comment: occ    Drug use: Not Currently           Objective   Physical Exam  Constitutional:       Appearance: She is well-developed.   HENT:      Head: Normocephalic and atraumatic.      Mouth/Throat:      Mouth: Mucous membranes are moist.      Pharynx: Oropharynx is clear.   Cardiovascular:      Rate and Rhythm: Normal rate and regular rhythm.      Heart sounds: Normal heart sounds.   Pulmonary:      Effort: Pulmonary effort is normal.      Breath sounds: Normal breath sounds.   Abdominal:      General: Bowel sounds are normal. There is no distension.      Palpations: Abdomen is soft.      Tenderness: There is no abdominal tenderness.   Musculoskeletal:         General: No swelling or tenderness.   Skin:     General: Skin is warm and dry.      Capillary Refill: Capillary refill takes less than 2 seconds.   Neurological:      Mental Status: She is alert.         Procedures           ED Course                                                       Medical Decision Making  Appears well, found to have threatened miscarriage.  No fetal pole seen though it is early.  Patient 5 weeks by dates.  Did discuss the unlikely possibility of a tubal pregnancy given ovarian lesion likely cyst.  Signs and symptoms of ectopic pregnancy  discussed, close follow-up encouraged.    Problems Addressed:  Threatened miscarriage: complicated acute illness or injury    Amount and/or Complexity of Data Reviewed  External Data Reviewed: labs.     Details: Quantitative hCG 4/9/2025 115, blood type O+ 11/11/2024  Labs: ordered.     Details: Hemoglobin 10  Radiology: ordered.    Risk  OTC drugs.        Final diagnoses:   Threatened miscarriage       ED Disposition  ED Disposition       ED Disposition   Discharge    Condition   Stable    Comment   --               [facility] [place] [id number] [address] [phone]  Schedule an appointment as soon as possible for a visit            Medication List        New Prescriptions      prenatal vitamin 28-0.8 28-0.8 MG tablet tablet  Take 1 tablet by mouth Daily.               Where to Get Your Medications        These medications were sent to [facility] #[id number] - [place], [place] - [address] [place] [address] - [phone]  - [phone] [address] [place] [address]      Hours: 24-hours Phone: [phone]   prenatal vitamin 28-0.8 28-0.8 MG tablet tablet            Alejandro Newberry MD  04/19/25 0331

## 2025-04-20 LAB — BACTERIA SPEC AEROBE CULT: NO GROWTH

## 2025-05-17 ENCOUNTER — APPOINTMENT (OUTPATIENT)
Dept: ULTRASOUND IMAGING | Facility: HOSPITAL | Age: 30
End: 2025-05-17
Payer: MEDICAID

## 2025-05-17 ENCOUNTER — APPOINTMENT (OUTPATIENT)
Dept: MRI IMAGING | Facility: HOSPITAL | Age: 30
End: 2025-05-17
Payer: MEDICAID

## 2025-05-17 ENCOUNTER — HOSPITAL ENCOUNTER (OUTPATIENT)
Facility: HOSPITAL | Age: 30
Setting detail: OBSERVATION
Discharge: HOME | End: 2025-05-18
Attending: INTERNAL MEDICINE | Admitting: INTERNAL MEDICINE
Payer: MEDICAID

## 2025-05-17 DIAGNOSIS — R51.9 ACUTE INTRACTABLE HEADACHE, UNSPECIFIED HEADACHE TYPE: Primary | ICD-10-CM

## 2025-05-17 PROBLEM — G43.909 MIGRAINE: Status: ACTIVE | Noted: 2025-05-17

## 2025-05-17 LAB
ALBUMIN SERPL-MCNC: 4 G/DL (ref 3.5–5.2)
ALBUMIN/GLOB SERPL: 1.2 G/DL
ALP SERPL-CCNC: 82 U/L (ref 39–117)
ALT SERPL W P-5'-P-CCNC: 22 U/L (ref 1–33)
ANION GAP SERPL CALCULATED.3IONS-SCNC: 9.8 MMOL/L (ref 5–15)
AST SERPL-CCNC: 19 U/L (ref 1–32)
B PARAPERT DNA SPEC QL NAA+PROBE: NOT DETECTED
B PERT DNA SPEC QL NAA+PROBE: NOT DETECTED
BASOPHILS # BLD AUTO: 0.04 10*3/MM3 (ref 0–0.2)
BASOPHILS NFR BLD AUTO: 0.3 % (ref 0–1.5)
BILIRUB SERPL-MCNC: 0.7 MG/DL (ref 0–1.2)
BUN SERPL-MCNC: 6 MG/DL (ref 6–20)
BUN/CREAT SERPL: 11.5 (ref 7–25)
C PNEUM DNA NPH QL NAA+NON-PROBE: NOT DETECTED
CALCIUM SPEC-SCNC: 8.9 MG/DL (ref 8.6–10.5)
CHLORIDE SERPL-SCNC: 100 MMOL/L (ref 98–107)
CO2 SERPL-SCNC: 22.2 MMOL/L (ref 22–29)
CREAT SERPL-MCNC: 0.52 MG/DL (ref 0.57–1)
CRP SERPL-MCNC: 0.97 MG/DL (ref 0–0.5)
DEPRECATED RDW RBC AUTO: 40.7 FL (ref 37–54)
EGFRCR SERPLBLD CKD-EPI 2021: 130 ML/MIN/1.73
EOSINOPHIL # BLD AUTO: 0.14 10*3/MM3 (ref 0–0.4)
EOSINOPHIL NFR BLD AUTO: 1.2 % (ref 0.3–6.2)
ERYTHROCYTE [DISTWIDTH] IN BLOOD BY AUTOMATED COUNT: 12.9 % (ref 12.3–15.4)
ERYTHROCYTE [SEDIMENTATION RATE] IN BLOOD: 29 MM/HR (ref 0–20)
FLUAV SUBTYP SPEC NAA+PROBE: NOT DETECTED
FLUBV RNA ISLT QL NAA+PROBE: NOT DETECTED
GLOBULIN UR ELPH-MCNC: 3.3 GM/DL
GLUCOSE SERPL-MCNC: 109 MG/DL (ref 65–99)
HADV DNA SPEC NAA+PROBE: NOT DETECTED
HCOV 229E RNA SPEC QL NAA+PROBE: NOT DETECTED
HCOV HKU1 RNA SPEC QL NAA+PROBE: NOT DETECTED
HCOV NL63 RNA SPEC QL NAA+PROBE: NOT DETECTED
HCOV OC43 RNA SPEC QL NAA+PROBE: NOT DETECTED
HCT VFR BLD AUTO: 35.4 % (ref 34–46.6)
HGB BLD-MCNC: 11.5 G/DL (ref 12–15.9)
HMPV RNA NPH QL NAA+NON-PROBE: NOT DETECTED
HPIV1 RNA ISLT QL NAA+PROBE: NOT DETECTED
HPIV2 RNA SPEC QL NAA+PROBE: NOT DETECTED
HPIV3 RNA NPH QL NAA+PROBE: NOT DETECTED
HPIV4 P GENE NPH QL NAA+PROBE: NOT DETECTED
IMM GRANULOCYTES # BLD AUTO: 0.08 10*3/MM3 (ref 0–0.05)
IMM GRANULOCYTES NFR BLD AUTO: 0.7 % (ref 0–0.5)
LYMPHOCYTES # BLD AUTO: 1.14 10*3/MM3 (ref 0.7–3.1)
LYMPHOCYTES NFR BLD AUTO: 9.6 % (ref 19.6–45.3)
M PNEUMO IGG SER IA-ACNC: NOT DETECTED
MCH RBC QN AUTO: 28.2 PG (ref 26.6–33)
MCHC RBC AUTO-ENTMCNC: 32.5 G/DL (ref 31.5–35.7)
MCV RBC AUTO: 86.8 FL (ref 79–97)
MONOCYTES # BLD AUTO: 0.44 10*3/MM3 (ref 0.1–0.9)
MONOCYTES NFR BLD AUTO: 3.7 % (ref 5–12)
NEUTROPHILS NFR BLD AUTO: 10.05 10*3/MM3 (ref 1.7–7)
NEUTROPHILS NFR BLD AUTO: 84.5 % (ref 42.7–76)
NRBC BLD AUTO-RTO: 0 /100 WBC (ref 0–0.2)
PLATELET # BLD AUTO: 264 10*3/MM3 (ref 140–450)
PMV BLD AUTO: 10.2 FL (ref 6–12)
POTASSIUM SERPL-SCNC: 3.6 MMOL/L (ref 3.5–5.2)
PROT SERPL-MCNC: 7.3 G/DL (ref 6–8.5)
RBC # BLD AUTO: 4.08 10*6/MM3 (ref 3.77–5.28)
RHINOVIRUS RNA SPEC NAA+PROBE: NOT DETECTED
RSV RNA NPH QL NAA+NON-PROBE: NOT DETECTED
SARS-COV-2 RNA RESP QL NAA+PROBE: NOT DETECTED
SODIUM SERPL-SCNC: 132 MMOL/L (ref 136–145)
WBC NRBC COR # BLD AUTO: 11.89 10*3/MM3 (ref 3.4–10.8)

## 2025-05-17 PROCEDURE — 70544 MR ANGIOGRAPHY HEAD W/O DYE: CPT

## 2025-05-17 PROCEDURE — 25010000002 METOCLOPRAMIDE PER 10 MG: Performed by: NURSE PRACTITIONER

## 2025-05-17 PROCEDURE — 85652 RBC SED RATE AUTOMATED: CPT | Performed by: NURSE PRACTITIONER

## 2025-05-17 PROCEDURE — 25010000002 MAGNESIUM SULFATE IN D5W 1G/100ML (PREMIX) 1-5 GM/100ML-% SOLUTION: Performed by: NURSE PRACTITIONER

## 2025-05-17 PROCEDURE — 70547 MR ANGIOGRAPHY NECK W/O DYE: CPT

## 2025-05-17 PROCEDURE — G0378 HOSPITAL OBSERVATION PER HR: HCPCS

## 2025-05-17 PROCEDURE — 96365 THER/PROPH/DIAG IV INF INIT: CPT

## 2025-05-17 PROCEDURE — 0202U NFCT DS 22 TRGT SARS-COV-2: CPT | Performed by: NURSE PRACTITIONER

## 2025-05-17 PROCEDURE — 86140 C-REACTIVE PROTEIN: CPT | Performed by: NURSE PRACTITIONER

## 2025-05-17 PROCEDURE — 99285 EMERGENCY DEPT VISIT HI MDM: CPT

## 2025-05-17 PROCEDURE — 96375 TX/PRO/DX INJ NEW DRUG ADDON: CPT

## 2025-05-17 PROCEDURE — 85025 COMPLETE CBC W/AUTO DIFF WBC: CPT | Performed by: NURSE PRACTITIONER

## 2025-05-17 PROCEDURE — 25010000002 ONDANSETRON PER 1 MG: Performed by: NURSE PRACTITIONER

## 2025-05-17 PROCEDURE — 70551 MRI BRAIN STEM W/O DYE: CPT

## 2025-05-17 PROCEDURE — 96366 THER/PROPH/DIAG IV INF ADDON: CPT

## 2025-05-17 PROCEDURE — 80053 COMPREHEN METABOLIC PANEL: CPT | Performed by: NURSE PRACTITIONER

## 2025-05-17 PROCEDURE — 25010000002 DIPHENHYDRAMINE PER 50 MG: Performed by: NURSE PRACTITIONER

## 2025-05-17 PROCEDURE — 25810000003 SODIUM CHLORIDE 0.9 % SOLUTION: Performed by: NURSE PRACTITIONER

## 2025-05-17 PROCEDURE — 76815 OB US LIMITED FETUS(S): CPT

## 2025-05-17 RX ORDER — METOCLOPRAMIDE HYDROCHLORIDE 5 MG/ML
5 INJECTION INTRAMUSCULAR; INTRAVENOUS ONCE
Status: COMPLETED | OUTPATIENT
Start: 2025-05-17 | End: 2025-05-17

## 2025-05-17 RX ORDER — SODIUM CHLORIDE 9 MG/ML
125 INJECTION, SOLUTION INTRAVENOUS CONTINUOUS
Status: DISCONTINUED | OUTPATIENT
Start: 2025-05-17 | End: 2025-05-18 | Stop reason: HOSPADM

## 2025-05-17 RX ORDER — SODIUM CHLORIDE 0.9 % (FLUSH) 0.9 %
10 SYRINGE (ML) INJECTION AS NEEDED
Status: DISCONTINUED | OUTPATIENT
Start: 2025-05-17 | End: 2025-05-18 | Stop reason: HOSPADM

## 2025-05-17 RX ORDER — LIDOCAINE HYDROCHLORIDE 20 MG/ML
JELLY TOPICAL ONCE
Status: COMPLETED | OUTPATIENT
Start: 2025-05-17 | End: 2025-05-17

## 2025-05-17 RX ORDER — MAGNESIUM SULFATE 1 G/100ML
1 INJECTION INTRAVENOUS ONCE
Status: COMPLETED | OUTPATIENT
Start: 2025-05-17 | End: 2025-05-17

## 2025-05-17 RX ORDER — METOCLOPRAMIDE 10 MG/1
10 TABLET ORAL
Status: DISCONTINUED | OUTPATIENT
Start: 2025-05-18 | End: 2025-05-18 | Stop reason: HOSPADM

## 2025-05-17 RX ORDER — MAGNESIUM SULFATE 1 G/100ML
1 INJECTION INTRAVENOUS 2 TIMES DAILY PRN
Status: DISCONTINUED | OUTPATIENT
Start: 2025-05-17 | End: 2025-05-18 | Stop reason: HOSPADM

## 2025-05-17 RX ORDER — DIPHENHYDRAMINE HYDROCHLORIDE 50 MG/ML
25 INJECTION, SOLUTION INTRAMUSCULAR; INTRAVENOUS 2 TIMES DAILY PRN
Status: DISCONTINUED | OUTPATIENT
Start: 2025-05-17 | End: 2025-05-18 | Stop reason: HOSPADM

## 2025-05-17 RX ORDER — DIPHENHYDRAMINE HYDROCHLORIDE 50 MG/ML
12.5 INJECTION, SOLUTION INTRAMUSCULAR; INTRAVENOUS ONCE
Status: COMPLETED | OUTPATIENT
Start: 2025-05-17 | End: 2025-05-17

## 2025-05-17 RX ORDER — POTASSIUM CHLORIDE 1500 MG/1
40 TABLET, EXTENDED RELEASE ORAL EVERY 4 HOURS
Status: COMPLETED | OUTPATIENT
Start: 2025-05-17 | End: 2025-05-18

## 2025-05-17 RX ORDER — ONDANSETRON 2 MG/ML
4 INJECTION INTRAMUSCULAR; INTRAVENOUS ONCE
Status: COMPLETED | OUTPATIENT
Start: 2025-05-17 | End: 2025-05-17

## 2025-05-17 RX ORDER — ACETAMINOPHEN 500 MG
1000 TABLET ORAL ONCE
Status: COMPLETED | OUTPATIENT
Start: 2025-05-17 | End: 2025-05-17

## 2025-05-17 RX ORDER — ONDANSETRON 4 MG/1
4 TABLET, ORALLY DISINTEGRATING ORAL EVERY 6 HOURS PRN
Status: DISCONTINUED | OUTPATIENT
Start: 2025-05-17 | End: 2025-05-18 | Stop reason: HOSPADM

## 2025-05-17 RX ORDER — ONDANSETRON 2 MG/ML
4 INJECTION INTRAMUSCULAR; INTRAVENOUS EVERY 6 HOURS PRN
Status: DISCONTINUED | OUTPATIENT
Start: 2025-05-17 | End: 2025-05-18 | Stop reason: HOSPADM

## 2025-05-17 RX ORDER — ACETAMINOPHEN 500 MG
1000 TABLET ORAL EVERY 6 HOURS
Status: DISCONTINUED | OUTPATIENT
Start: 2025-05-17 | End: 2025-05-18 | Stop reason: HOSPADM

## 2025-05-17 RX ORDER — SODIUM CHLORIDE 0.9 % (FLUSH) 0.9 %
10 SYRINGE (ML) INJECTION EVERY 12 HOURS SCHEDULED
Status: DISCONTINUED | OUTPATIENT
Start: 2025-05-17 | End: 2025-05-18 | Stop reason: HOSPADM

## 2025-05-17 RX ORDER — SODIUM CHLORIDE 9 MG/ML
40 INJECTION, SOLUTION INTRAVENOUS AS NEEDED
Status: DISCONTINUED | OUTPATIENT
Start: 2025-05-17 | End: 2025-05-18 | Stop reason: HOSPADM

## 2025-05-17 RX ADMIN — ACETAMINOPHEN 1000 MG: 500 TABLET ORAL at 20:56

## 2025-05-17 RX ADMIN — MAGNESIUM SULFATE IN DEXTROSE 1 G: 10 INJECTION, SOLUTION INTRAVENOUS at 14:44

## 2025-05-17 RX ADMIN — ONDANSETRON 4 MG: 2 INJECTION, SOLUTION INTRAMUSCULAR; INTRAVENOUS at 17:25

## 2025-05-17 RX ADMIN — Medication 10 ML: at 21:08

## 2025-05-17 RX ADMIN — ACETAMINOPHEN 1000 MG: 500 TABLET, FILM COATED ORAL at 14:44

## 2025-05-17 RX ADMIN — DIPHENHYDRAMINE HYDROCHLORIDE 12.5 MG: 50 INJECTION, SOLUTION INTRAMUSCULAR; INTRAVENOUS at 12:02

## 2025-05-17 RX ADMIN — SODIUM CHLORIDE 125 ML/HR: 9 INJECTION, SOLUTION INTRAVENOUS at 17:25

## 2025-05-17 RX ADMIN — SODIUM CHLORIDE 1000 ML: 9 INJECTION, SOLUTION INTRAVENOUS at 12:02

## 2025-05-17 RX ADMIN — POTASSIUM CHLORIDE 40 MEQ: 1500 TABLET, EXTENDED RELEASE ORAL at 20:56

## 2025-05-17 RX ADMIN — METOCLOPRAMIDE 5 MG: 5 INJECTION, SOLUTION INTRAMUSCULAR; INTRAVENOUS at 12:02

## 2025-05-17 RX ADMIN — LIDOCAINE HYDROCHLORIDE: 20 JELLY TOPICAL at 14:45

## 2025-05-17 NOTE — CONSULTS
Primary Care Provider: Provider, No Known     Consult requested by:  ED    Reason for Consultation: Neurological evaluation -     History taken from: patient chart RN    Chief complaint: Headache       SUBJECTIVE:    History of present illness: Background per H&P: Amira Akers is a 29 y.o. female who is 9 weeks 6 days pregnant and has no significant past medical history presents to [facility] 5/17/2025 with new onset headache ongoing for 72 hours that is been persistent described as a pressure-like feeling that is pan cephalic without radiation associated with photophobia and phonophobia.  Patient has had 1 episode of nausea this morning without emesis.  She had a fever of 103 Fahrenheit yesterday.  She tried Tylenol over-the-counter which did not help her migraine much.  Patient appears to be laying comfortably on her right side interacting with writer.  Patient does not have a history of migraines.  No recent craniocervical trauma.      No history of blood clotting disorders, prior TIA or strokes or similar episodes.  She does endorse tender sinuses and was febrile yesterday Tmax 103 Fahrenheit.  She denies unilateral weakness or speech disturbance.    In the ED, the patient received Benadryl and metoclopramide with little relief.     MRI brain and MRV head were attempted however the patient was unable to complete the MRV due to the loud noise and was aborted.  MRI brain is pending as of this note.      - Portions of the above HPI were copied from previous encounters and edited as appropriate. PMH as detailed below.     Review of Systems   Constitutional:  Negative for fatigue and fever.   HENT:  Positive for congestion, sinus pressure and sinus pain. Negative for ear discharge, ear pain, postnasal drip, rhinorrhea, tinnitus, trouble swallowing and voice change.    Eyes:  Positive for photophobia. Negative for pain and visual disturbance.   Respiratory:  Negative for chest tightness and  shortness of breath.    Cardiovascular:  Negative for chest pain.   Gastrointestinal:  Positive for nausea. Negative for vomiting.   Musculoskeletal:  Negative for back pain, neck pain and neck stiffness.   Neurological:  Positive for headaches. Negative for dizziness, tremors, seizures, syncope, facial asymmetry, speech difficulty, weakness, light-headedness and numbness.   All other systems reviewed and are negative.         PATIENT HISTORY:  History reviewed. No pertinent past medical history., History reviewed. No pertinent surgical history., History reviewed. No pertinent family history.,   Social History     Tobacco Use    Smoking status: Never   Substance Use Topics    Alcohol use: Yes     Comment: occ    Drug use: Not Currently   ,   Prior to Admission medications    Medication Sig Start Date End Date Taking? Authorizing Provider   albuterol (PROVENTIL) (2.5 MG/3ML) 0.083% nebulizer solution Inhale 2.5 mg Every 4 (Four) Hours As Needed. 6/6/23   ProviderSimona MD   levocetirizine (XYZAL) 5 MG tablet Take 1 tablet by mouth Every Evening for 30 days. 8/27/24 9/26/24  Audrey Warren APRN   Prenatal Vit-Fe Fumarate-FA (prenatal vitamin 28-0.8) 28-0.8 MG tablet tablet Take 1 tablet by mouth Daily. 4/19/25   Alejandro Newberry MD    Allergies:  Penicillins and Amoxicillin    Current Facility-Administered Medications   Medication Dose Route Frequency Provider Last Rate Last Admin    sodium chloride 0.9 % flush 10 mL  10 mL Intravenous PRN Hazel Merida APRN        sodium chloride 0.9 % flush 10 mL  10 mL Intravenous PRN Hazel Merida APRN         Current Outpatient Medications   Medication Sig Dispense Refill    albuterol (PROVENTIL) (2.5 MG/3ML) 0.083% nebulizer solution Inhale 2.5 mg Every 4 (Four) Hours As Needed.      levocetirizine (XYZAL) 5 MG tablet Take 1 tablet by mouth Every Evening for 30 days. 30 tablet 0    Prenatal Vit-Fe Fumarate-FA (prenatal vitamin 28-0.8) 28-0.8 MG tablet tablet Take 1  tablet by mouth Daily. 90 tablet 3        ________________________________________________________        OBJECTIVE:  Upon today's exam,     GEN: NAD, pleasant, cooperative, neck supple  CHEST: No signs of resp distress, on room air    NEURO  MENTAL STATUS: AAOx3, memory intact, fund of knowledge appropriate  LANG/SPEECH: Naming and repetition intact, fluent, follows 3-step commands    CRANIAL NERVES:  II-XII grossly intact    MOTOR:  Motor strength 5/5 throughout, symmetric.     REFLEXES: 2/4 throughout    SENSORY:  Subjective decreased sensation to right V2 distribution otherwise remainder quadrants intact.  No hemineglect, no extinction to double-sided stimulation (visual & tactile)  COORD: Normal finger to nose          NIH Stroke Scale  1a. Level of Consciousness: 0-->Alert, keenly responsive  1b. LOC Questions: 0-->Answers both questions correctly  1c. LOC Commands: 0-->Performs both tasks correctly  2. Best Gaze: 0-->Normal  3. Visual: 0-->No visual loss  4. Facial Palsy: 0-->Normal symmetrical movements  5a. Motor Arm, Left: 0-->No drift, limb holds 90 (or 45) degrees for full 10 secs  5b. Motor Arm, Right: 0-->No drift, limb holds 90 (or 45) degrees for full 10 secs  6a. Motor Leg, Left: 0-->No drift, leg holds 30 degree position for full 5 secs  6b. Motor Leg, Right: 0-->No drift, leg holds 30 degree position for full 5 secs  7. Limb Ataxia: 0-->Absent  8. Sensory: 1-->Mild-to-moderate sensory loss, patient feels pinprick is less sharp or is dull on the affected side, or there is a loss of superficial pain with pinprick, but patient is aware of being touched  9. Best Language: 0-->No aphasia, normal  10. Dysarthria: 0-->Normal  11. Extinction and Inattention (formerly Neglect): 0-->No abnormality  Total (NIH Stroke Scale): 1         ________________________________________________________   RESULTS REVIEW:    VITAL SIGNS:   Temp:  [98.2 °F (36.8 °C)] 98.2 °F (36.8 °C)  Heart Rate:  [89-96] 92  Resp:  [18]  "18  BP: (100-121)/(50-73) 118/73     LABS:      Lab 05/17/25  1151   WBC 11.89*   HEMOGLOBIN 11.5*   HEMATOCRIT 35.4   PLATELETS 264   NEUTROS ABS 10.05*   IMMATURE GRANS (ABS) 0.08*   LYMPHS ABS 1.14   MONOS ABS 0.44   EOS ABS 0.14   MCV 86.8   SED RATE 29*   CRP 0.97*         Lab 05/17/25  1151   SODIUM 132*   POTASSIUM 3.6   CHLORIDE 100   CO2 22.2   ANION GAP 9.8   BUN 6   CREATININE 0.52*   EGFR 130.0   GLUCOSE 109*   CALCIUM 8.9         Lab 05/17/25  1151   TOTAL PROTEIN 7.3   ALBUMIN 4.0   GLOBULIN 3.3   ALT (SGPT) 22   AST (SGOT) 19   BILIRUBIN 0.7   ALK PHOS 82                     UA          4/9/2025    12:00 4/19/2025    00:50   Urinalysis   Squamous Epithelial Cells, UA 0-2  0-2    Specific Gravity, UA 1.016  1.024    Ketones, UA Negative  Negative    Blood, UA Moderate (2+)  Moderate (2+)    Leukocytes, UA Negative  Small (1+)    Nitrite, UA Negative  Negative    RBC, UA 6-10  21-50    WBC, UA 0-2  6-10    Bacteria, UA None Seen  Trace        No results found for: \"TSH\", \"LDL\", \"HGBA1C\", \"JREHENFH56\", \"PHENYTOIN\", \"PHENOBARB\", \"VALPROATE\", \"CBMZ\"    IMAGING STUDIES:  No radiology results for the last day    I reviewed the patient's new clinical results.    ________________________________________________________     PROBLEM LIST:    * No active hospital problems. *            ASSESSMENT/PLAN:    9 weeks and 6 days pregnant  Acute onset pancephalic pressure-like headache ongoing for greater than 72 hours  Right facial numbness along the V2 distribution ongoing since 5/16/2025  Tender bilateral maxillary sinus  Differential includes but not limited to: Tension type headache, acute migraine, acute sinusitis, CVST, AIS, demyelinating disease, secondary headache    No nuchal rigidity, AO x 3, no concern for CNS infection at this time.     Neuroimaging to include: MRI Brain WO, MRV, MRA H/N  Nonpharmacologic options: ice pack or cold towel  Tylenol 1000 mg q6h PRN. If this does not help, can escalate to " acetaminophen 1000 mg plus metoclopramide 10 mg OR benadryl 25 mg and metoclopramide 10 mg.   NS 1 liter over 2 to 4 hours. This can be repeated every 6-12h  Magnesium sulfate 1,000 mg IV infusion over 1 hour. This can be repeated every 8-12 hours.  Sumatriptan is an option, however this does carry a theoretic possibility of vasoconstriction of uteroplacental vessels and/or increased uterotonic activity. Some data shows that it is okay to use (category C).  Risks and benefits would need to be discussed with the patient.  Initial dose can be used at 50 mg PO x1 or 4 mg SQ x1.  Recommend observation admission to assess overnight.         I discussed the patient's findings and my recommendations with patient, nursing staff, and primary care team    Emiliano Beckett MD  05/17/25  14:59 EDT

## 2025-05-17 NOTE — H&P
[facility] Medicine Services  History & Physical    Patient Name: Amira Akers  : 1995  MRN: [id number]  Primary Care Physician:  Ileana Burris MD  Date of admission: 2025  Date and Time of Service: 2025 at 18:18 EDT    Subjective      Chief Complaint:   Chief Complaint   Patient presents with    Headache        History of Present Illness: Amira Akers is a 29 y.o. female with a CMH of pregnancy who presented to [facility] on 2025 with Headache   Headache  Visit:  Initial  Initial:     Chronicity:  New    Episode onset: 3 days.    Progression since onset:  Worsening    Headache location:  Frontal and temporal    Pain Severity Number:  7/10    Headache quality:  Dull    Treatments tried:  Tylenol    Relieved by:  Darkened room    Associated symptoms: fatigue, headaches, photophobia and phonophobia      Associated symptoms: no abdominal pain, no chest pain, no congestion, no cough, no diarrhea, no dizziness, no dysuria, no fever, no myalgias, no nausea, no shortness of breath, no sore throat, no vomiting, no weakness, no wheezing, no numbness in extremities and speech not impaired    - Due to patient headache not improving at home with Tylenol use patient came into the ER for further evaluation and has been 3 days uncontrolled headache.      Review of Systems   Constitutional:  Positive for fatigue. Negative for chills, diaphoresis and fever.   HENT:  Negative for congestion, sneezing and sore throat.    Eyes:  Positive for photophobia. Negative for visual disturbance.   Respiratory:  Negative for cough, chest tightness, shortness of breath and wheezing.    Cardiovascular:  Negative for chest pain, palpitations and leg swelling.   Gastrointestinal:  Negative for abdominal distention, abdominal pain, constipation, diarrhea, nausea and vomiting.   Genitourinary:  Negative for decreased urine volume, difficulty urinating, dysuria, frequency and  urgency.   Musculoskeletal:  Negative for arthralgias, gait problem and myalgias.   Skin:  Negative for color change, pallor and wound.   Neurological:  Positive for headaches. Negative for dizziness, syncope, weakness and light-headedness.   Psychiatric/Behavioral:  Negative for agitation, behavioral problems, confusion and decreased concentration.        Personal History     History reviewed. No pertinent past medical history.    History reviewed. No pertinent surgical history.    Family History: family history is not on file. Otherwise pertinent FHx was reviewed and not pertinent to current issue.    Social History:  reports that she has never smoked. She does not have any smokeless tobacco history on file. She reports current alcohol use. She reports that she does not currently use drugs.    Home Medications:  Prior to Admission Medications       None              Allergies:  Allergies   Allergen Reactions    Penicillins Hives    Amoxicillin Unknown - High Severity       Objective      Vitals:   Temp:  [98.2 °F (36.8 °C)] 98.2 °F (36.8 °C)  Heart Rate:  [89-96] 90  Resp:  [18] 18  BP: (100-121)/(50-73) 100/55  Body mass index is 43.35 kg/m².  Physical Exam  Vitals and nursing note reviewed.   Constitutional:       General: She is not in acute distress.     Appearance: Normal appearance. She is normal weight. She is ill-appearing. She is not toxic-appearing.   HENT:      Head: Normocephalic and atraumatic.      Nose: Nose normal.      Mouth/Throat:      Mouth: Mucous membranes are moist.      Pharynx: Oropharynx is clear.   Eyes:      General: No scleral icterus.     Extraocular Movements: Extraocular movements intact.      Conjunctiva/sclera: Conjunctivae normal.      Pupils: Pupils are equal, round, and reactive to light.   Cardiovascular:      Rate and Rhythm: Normal rate and regular rhythm.      Pulses: Normal pulses.      Heart sounds: Normal heart sounds. No murmur heard.     No friction rub. No gallop.    Pulmonary:      Effort: Pulmonary effort is normal. No respiratory distress.      Breath sounds: Normal breath sounds. No wheezing, rhonchi or rales.   Abdominal:      General: Abdomen is flat. Bowel sounds are normal. There is no distension.      Palpations: Abdomen is soft.      Tenderness: There is no abdominal tenderness. There is no guarding.   Musculoskeletal:         General: Normal range of motion.      Cervical back: Normal range of motion. No rigidity or tenderness.      Right lower leg: No edema.      Left lower leg: No edema.   Skin:     General: Skin is warm.      Coloration: Skin is not jaundiced or pale.   Neurological:      General: No focal deficit present.      Mental Status: She is alert and oriented to person, place, and time. Mental status is at baseline.      Cranial Nerves: No cranial nerve deficit.      Sensory: No sensory deficit.      Motor: No weakness.      Coordination: Coordination normal.   Psychiatric:         Mood and Affect: Mood normal.         Behavior: Behavior normal.         Thought Content: Thought content normal.         Judgment: Judgment normal.         Diagnostic Data:  Lab Results (last 24 hours)       Procedure Component Value Units Date/Time    Respiratory Panel PCR w/COVID-19(SARS-CoV-2) LIANNE/JOEY/TATIANA/PAD/COR/ALONSO In-House, NP Swab in UTM/VTM, 2 HR TAT - Swab, Nasopharynx [[id number]]  (Normal) Collected: 05/17/25 1152    Specimen: Swab from Nasopharynx Updated: 05/17/25 1244     ADENOVIRUS, PCR Not Detected     Coronavirus 229E Not Detected     Coronavirus HKU1 Not Detected     Coronavirus NL63 Not Detected     Coronavirus OC43 Not Detected     COVID19 Not Detected     Human Metapneumovirus Not Detected     Human Rhinovirus/Enterovirus Not Detected     Influenza A PCR Not Detected     Influenza B PCR Not Detected     Parainfluenza Virus 1 Not Detected     Parainfluenza Virus 2 Not Detected     Parainfluenza Virus 3 Not Detected     Parainfluenza Virus 4 Not Detected      RSV, PCR Not Detected     Bordetella pertussis pcr Not Detected     Bordetella parapertussis PCR Not Detected     Chlamydophila pneumoniae PCR Not Detected     Mycoplasma pneumo by PCR Not Detected    Narrative:      In the setting of a positive respiratory panel with a viral infection PLUS a negative procalcitonin without other underlying concern for bacterial infection, consider observing off antibiotics or discontinuation of antibiotics and continue supportive care. If the respiratory panel is positive for atypical bacterial infection (Bordetella pertussis, Chlamydophila pneumoniae, or Mycoplasma pneumoniae), consider antibiotic de-escalation to target atypical bacterial infection.    Comprehensive Metabolic Panel [[id number]]  (Abnormal) Collected: 05/17/25 1151    Specimen: Blood from Arm, Right Updated: 05/17/25 1228     Glucose 109 mg/dL      BUN 6 mg/dL      Creatinine 0.52 mg/dL      Sodium 132 mmol/L      Potassium 3.6 mmol/L      Chloride 100 mmol/L      CO2 22.2 mmol/L      Calcium 8.9 mg/dL      Total Protein 7.3 g/dL      Albumin 4.0 g/dL      ALT (SGPT) 22 U/L      AST (SGOT) 19 U/L      Alkaline Phosphatase 82 U/L      Total Bilirubin 0.7 mg/dL      Globulin 3.3 gm/dL      A/G Ratio 1.2 g/dL      BUN/Creatinine Ratio 11.5     Anion Gap 9.8 mmol/L      eGFR 130.0 mL/min/1.73     Narrative:      GFR Categories in Chronic Kidney Disease (CKD)              GFR Category          GFR (mL/min/1.73)    Interpretation  G1                    90 or greater        Normal or high (1)  G2                    60-89                Mild decrease (1)  G3a                   45-59                Mild to moderate decrease  G3b                   30-44                Moderate to severe decrease  G4                    15-29                Severe decrease  G5                    14 or less           Kidney failure    (1)In the absence of evidence of kidney disease, neither GFR category G1 or G2 fulfill the criteria for  CKD.    eGFR calculation 2021 CKD-EPI creatinine equation, which does not include race as a factor    C-reactive Protein [[id number]]  (Abnormal) Collected: 05/17/25 1151    Specimen: Blood from Arm, Right Updated: 05/17/25 1228     C-Reactive Protein 0.97 mg/dL     Sedimentation Rate [[id number]]  (Abnormal) Collected: 05/17/25 1151    Specimen: Blood from Arm, Right Updated: 05/17/25 1213     Sed Rate 29 mm/hr     CBC & Differential [[id number]]  (Abnormal) Collected: 05/17/25 1151    Specimen: Blood from Arm, Right Updated: 05/17/25 1201    Narrative:      The following orders were created for panel order CBC & Differential.  Procedure                               Abnormality         Status                     ---------                               -----------         ------                     CBC Auto Differential[[id number]]        Abnormal            Final result                 Please view results for these tests on the individual orders.    CBC Auto Differential [[id number]]  (Abnormal) Collected: 05/17/25 1151    Specimen: Blood from Arm, Right Updated: 05/17/25 1201     WBC 11.89 10*3/mm3      RBC 4.08 10*6/mm3      Hemoglobin 11.5 g/dL      Hematocrit 35.4 %      MCV 86.8 fL      MCH 28.2 pg      MCHC 32.5 g/dL      RDW 12.9 %      RDW-SD 40.7 fl      MPV 10.2 fL      Platelets 264 10*3/mm3      Neutrophil % 84.5 %      Lymphocyte % 9.6 %      Monocyte % 3.7 %      Eosinophil % 1.2 %      Basophil % 0.3 %      Immature Grans % 0.7 %      Neutrophils, Absolute 10.05 10*3/mm3      Lymphocytes, Absolute 1.14 10*3/mm3      Monocytes, Absolute 0.44 10*3/mm3      Eosinophils, Absolute 0.14 10*3/mm3      Basophils, Absolute 0.04 10*3/mm3      Immature Grans, Absolute 0.08 10*3/mm3      nRBC 0.0 /100 WBC              Imaging Results (Last 24 Hours)       Procedure Component Value Units Date/Time    MRI Angiogram Neck Without Contrast [[id number]] Collected: 05/17/25 1658     Updated: 05/17/25 1722    Narrative:       MRI ANGIOGRAM NECK WO CONTRAST    Date of Exam: 5/17/2025 4:16 PM EDT    Indication: R face numbness with new HA.     Comparison: None available.    Technique:  Routine 3-D time-of-flight gradient echo imaging was obtained of the neck without contrast administration.      Findings:  The bilateral common carotid, bilateral carotid bifurcations, and bilateral internal carotid arteries are widely patent. The bilateral vertebral arteries are widely patent. The left vertebral artery is dominant.      Impression:      Impression:  Major arterial vasculature within neck appears widely patent, with no hemodynamically significant stenosis or dissection.        Electronically Signed: Oscar Robles MD    5/17/2025 5:20 PM EDT    Workstation ID: [id number]    MRI Angiogram Head Without Contrast [[id number]] Collected: 05/17/25 1714     Updated: 05/17/25 1718    Narrative:      MRI ANGIOGRAM HEAD WO CONTRAST    Date of Exam: 5/17/2025 4:15 PM EDT    Indication: R face numbness with new HA.     Comparison: MRI brain from today    Technique:  Routine 3-D time-of-flight gradient echo imaging was obtained of the head without contrast administration.      Findings:  The bilateral intracranial internal carotid, bilateral middle cerebral, bilateral anterior cerebral, and anterior communicating arteries are widely patent. The bilateral intracranial vertebral, basilar, and bilateral posterior cerebral arteries are   widely patent. No definite posterior communicating artery is identified on either side. No intracranial aneurysm is identified. The visualized portions of the bilateral superior cerebellar, anteroinferior cerebellar, and posterior inferior cerebellar   arteries are unremarkable.      Impression:      Impression:  Major arterial vasculature within head appears widely patent, with no thrombus or aneurysm.        Electronically Signed: Oscar Robles MD    5/17/2025 5:16 PM EDT    Workstation ID: [id number]    MRI Angiogram  Venogram Head [[id number]] Collected: 05/17/25 1706     Updated: 05/17/25 1712    Narrative:      MRI ANGIOGRAM VENOGRAM HEAD    Date of Exam: 5/17/2025 4:13 PM EDT    Indication: global headache with right sided facial numbness.     Comparison: MRI brain from today    Technique:  Routine 3-D time-of-flight gradient echo imaging was obtained of the head without contrast administration.      Findings:  The major dural venous sinuses are widely patent, with no evidence of thrombus. Note is made of a fenestrated right sigmoid sinus.      Impression:      Impression:  Examination appears within normal limits.        Electronically Signed: Oscar Robles MD    5/17/2025 5:10 PM EDT    Workstation ID: [id number]    MRI Brain Without Contrast [[id number]] Collected: 05/17/25 1505     Updated: 05/17/25 1512    Narrative:      MRI BRAIN WO CONTRAST    Date of Exam: 5/17/2025 2:01 PM EDT    Indication: Global headache with right sided facial numbness.     Comparison: Head CT dated 9/3/2018    Technique:  Routine multiplanar/multisequence sequence images of the brain were obtained without contrast administration.    Findings:  There are no areas of restricted diffusion to suggest acute infarct or recent ischemia. The ventricles and sulci are proportional without evidence of volume loss or hydrocephalus. There is no mass effect or midline shift. There is no acute intracranial   hemorrhage. There is no abnormal extra-axial fluid collection. There are no abnormal areas of intrinsic T1 hyperintense signal abnormality. There are no abnormal areas of susceptibility. There is no white matter signal abnormality. There is a partially   empty sella configuration. The craniocervical junction appears normal. The T2-weighted intracranial flow voids appear preserved. Visualized orbits and globes appear symmetric. Paranasal sinuses and mastoid air cells are well aerated.      Impression:      Impression:  No acute intracranial abnormality. No  evidence of mass, hemorrhage, or hydrocephalus.          Electronically Signed: Raul Garnett MD    5/17/2025 3:10 PM EDT    Workstation ID: [id number]              Assessment & Plan        This is a 29 y.o. female with:    Active and Resolved Problems  Active Hospital Problems    Diagnosis  POA    **Migraine [G43.909]  Yes      Resolved Hospital Problems   No resolved problems to display.     Migraine  - Neurology consult in the ER of which significant MRI imaging was performed and was negative for acute pathology.  Recommendation by neurology for Tylenol scheduled, metoclopramide, Benadryl, normal saline, magnesium sulfate.  If patient continues to have uncontrolled headache can consider sumatriptan but there is a theoretical risk and it is a category C medication per neurology [would recommend prior to using medicatio it is discussed with patient about risks and benefits].     Pregnancy with uncontrolled headaches  - 10 weeks pregnant.  - OB/GYN consulted for following during inpatient stay      VTE Prophylaxis:  Mechanical VTE prophylaxis orders are signed & held.          The patient desires to be as follows:    CODE STATUS:    Code Status (Patient has no pulse and is not breathing): CPR (Attempt to Resuscitate)  Medical Interventions (Patient has pulse or is breathing): Full Support  Level Of Support Discussed With: Patient        Gris Maloney, who can be contacted at [phone], is the designated person to make medical decisions on the patient's behalf if She is incapable of doing so. This was clarified with patient and/or next of kin on 5/17/2025 during the course of this H&P.    Admission Status:  I believe this patient meets Observation status.    Expected Length of Stay: 1-2 days     PDMP and Medication Dispenses via Sidebar reviewed and consistent with patient reported medications.    I discussed the patient's findings and my recommendations with patient and nursing staff.      Signature:     This  document has been electronically signed by Tano Jiang MD on May 17, 2025 18:18 EDT   [facility], operated by [facility] Hospitalist Team

## 2025-05-17 NOTE — Clinical Note
Level of Care: Med/Surg [1]   Admitting Physician: RAJINDER ESPINOZA [[id number]]   Attending Physician: RAJINDER ESPINOZA [[id number]]

## 2025-05-17 NOTE — ED PROVIDER NOTES
Subjective   History of Present Illness  29 y.o. 9 week 6 days gestation female who complains of headaches for 3 day(s). Denies history blood clotting disorders. Denies previous health issues with previous pregnancies. Description of pain: global, pressure. frequency continuously. Associated symptoms: congestion, fever, light sensitivity, nausea, vomiting, and weakness. Pain relief: unable to obtain relief with OTC meds. Precipitating factors: stress. She denies a history of recent head injury. Prior neurological history: negative for no neurological problems. Denies ill contacts. Decreased appetite, drinking PO fluids. Urination without difficulty.  She reports that she saw her OB/GYN this past Thursday had an ultrasound and lab work with good report.    Patient's last menstrual period was 2025 (exact date).   A1 (spontaneous)                   History provided by:  Patient   used: Yes (Mariposa Alcantara ID# [id number])        Review of Systems   Constitutional:  Positive for fever (103). Negative for chills and diaphoresis.   HENT:  Negative for ear discharge and rhinorrhea.    Eyes:  Negative for photophobia, pain and visual disturbance.   Respiratory:  Negative for shortness of breath.    Cardiovascular:  Negative for chest pain.   Genitourinary:  Negative for pelvic pain, vaginal bleeding, vaginal discharge and vaginal pain.   Musculoskeletal:  Positive for neck pain. Negative for gait problem and neck stiffness.   Skin:  Negative for pallor and rash.   Neurological:  Positive for numbness (right side of face-since yesterday) and headaches. Negative for dizziness, speech difficulty and weakness.   Psychiatric/Behavioral:  Negative for agitation and confusion.    All other systems reviewed and are negative.      History reviewed. No pertinent past medical history.    Allergies   Allergen Reactions    Penicillins Hives    Amoxicillin Unknown - High Severity       History reviewed. No  pertinent surgical history.    History reviewed. No pertinent family history.    Social History     Socioeconomic History    Marital status: Single   Tobacco Use    Smoking status: Never   Substance and Sexual Activity    Alcohol use: Yes     Comment: occ    Drug use: Not Currently           Objective   Physical Exam  Vitals and nursing note reviewed.   Constitutional:       General: She is awake. She is not in acute distress.     Appearance: Normal appearance. She is well-developed and normal weight.   HENT:      Head: Normocephalic and atraumatic.      Right Ear: Tympanic membrane, ear canal and external ear normal.      Left Ear: Tympanic membrane, ear canal and external ear normal.      Nose: Nose normal.      Mouth/Throat:      Mouth: Mucous membranes are moist.      Pharynx: Oropharynx is clear.   Eyes:      Extraocular Movements: Extraocular movements intact.      Conjunctiva/sclera: Conjunctivae normal.      Pupils: Pupils are equal, round, and reactive to light.   Neck:      Trachea: Trachea and phonation normal.      Meningeal: Brudzinski's sign and Kernig's sign absent.   Cardiovascular:      Rate and Rhythm: Normal rate and regular rhythm.      Pulses: Normal pulses.      Heart sounds: Normal heart sounds, S1 normal and S2 normal. Heart sounds not distant. No murmur heard.     No friction rub. No gallop.   Pulmonary:      Effort: Pulmonary effort is normal.      Breath sounds: Normal breath sounds and air entry.   Musculoskeletal:         General: Normal range of motion.      Cervical back: Full passive range of motion without pain, normal range of motion and neck supple. No erythema or rigidity. No spinous process tenderness. Normal range of motion.   Skin:     General: Skin is warm and dry.      Capillary Refill: Capillary refill takes less than 2 seconds.   Neurological:      Mental Status: She is alert and oriented to person, place, and time.      GCS: GCS eye subscore is 4. GCS verbal subscore is 5.  "GCS motor subscore is 6.      Cranial Nerves: No cranial nerve deficit.      Sensory: Sensory deficit (right facial decreased sensation) present.      Motor: No abnormal muscle tone.      Deep Tendon Reflexes:      Reflex Scores:       Patellar reflexes are 1+ on the right side and 1+ on the left side.  Psychiatric:         Behavior: Behavior is cooperative.         Procedures           ED Course  ED Course as of 05/17/25 1709   Sat May 17, 2025   1435 Dr. Beckett at bedside. He would like patient to have Tylenol 1 gram PO and Mg 1 gram IV.  [AL]   1659 Awaiting MRA MRV results. [AL]      ED Course User Index  [AL] Hazel Merida, APRN                                Total (NIH Stroke Scale): 1                      Medical Decision Making  Problems Addressed:  Acute intractable headache, unspecified headache type: complicated acute illness or injury    Amount and/or Complexity of Data Reviewed  Labs: ordered. Decision-making details documented in ED Course.  Radiology: ordered. Decision-making details documented in ED Course.  Discussion of management or test interpretation with external provider(s): Spoke with Dr. Beckett, neurologist on-call who recommends MRI of the brain without IV contrast and MRI of the brain.  If negative, okay to discharge home and treat for migraine.    Risk  OTC drugs.  Prescription drug management.    Interpreted by radiologist as below:     MRI Brain Without Contrast  Result Date: 5/17/2025  Impression: No acute intracranial abnormality. No evidence of mass, hemorrhage, or hydrocephalus. Electronically Signed: Raul Garnett MD  5/17/2025 3:10 PM EDT  Workstation ID: [id number]        /60   Pulse 96   Temp 98.2 °F (36.8 °C) (Oral)   Resp 18   Ht 160 cm (63\")   Wt 111 kg (244 lb 11.4 oz)   LMP 03/08/2025 (Exact Date)   SpO2 100%   BMI 43.35 kg/m²      Lab Results (last 24 hours)       Procedure Component Value Units Date/Time    CBC & Differential [[id number]]  (Abnormal) " Collected: 05/17/25 1151    Specimen: Blood from Arm, Right Updated: 05/17/25 1201    Narrative:      The following orders were created for panel order CBC & Differential.  Procedure                               Abnormality         Status                     ---------                               -----------         ------                     CBC Auto Differential[[id number]]        Abnormal            Final result                 Please view results for these tests on the individual orders.    Comprehensive Metabolic Panel [[id number]]  (Abnormal) Collected: 05/17/25 1151    Specimen: Blood from Arm, Right Updated: 05/17/25 1228     Glucose 109 mg/dL      BUN 6 mg/dL      Creatinine 0.52 mg/dL      Sodium 132 mmol/L      Potassium 3.6 mmol/L      Chloride 100 mmol/L      CO2 22.2 mmol/L      Calcium 8.9 mg/dL      Total Protein 7.3 g/dL      Albumin 4.0 g/dL      ALT (SGPT) 22 U/L      AST (SGOT) 19 U/L      Alkaline Phosphatase 82 U/L      Total Bilirubin 0.7 mg/dL      Globulin 3.3 gm/dL      A/G Ratio 1.2 g/dL      BUN/Creatinine Ratio 11.5     Anion Gap 9.8 mmol/L      eGFR 130.0 mL/min/1.73     Narrative:      GFR Categories in Chronic Kidney Disease (CKD)              GFR Category          GFR (mL/min/1.73)    Interpretation  G1                    90 or greater        Normal or high (1)  G2                    60-89                Mild decrease (1)  G3a                   45-59                Mild to moderate decrease  G3b                   30-44                Moderate to severe decrease  G4                    15-29                Severe decrease  G5                    14 or less           Kidney failure    (1)In the absence of evidence of kidney disease, neither GFR category G1 or G2 fulfill the criteria for CKD.    eGFR calculation 2021 CKD-EPI creatinine equation, which does not include race as a factor    C-reactive Protein [[id number]]  (Abnormal) Collected: 05/17/25 1151    Specimen: Blood from Arm, Right  Updated: 05/17/25 1228     C-Reactive Protein 0.97 mg/dL     Sedimentation Rate [[id number]]  (Abnormal) Collected: 05/17/25 1151    Specimen: Blood from Arm, Right Updated: 05/17/25 1213     Sed Rate 29 mm/hr     CBC Auto Differential [[id number]]  (Abnormal) Collected: 05/17/25 1151    Specimen: Blood from Arm, Right Updated: 05/17/25 1201     WBC 11.89 10*3/mm3      RBC 4.08 10*6/mm3      Hemoglobin 11.5 g/dL      Hematocrit 35.4 %      MCV 86.8 fL      MCH 28.2 pg      MCHC 32.5 g/dL      RDW 12.9 %      RDW-SD 40.7 fl      MPV 10.2 fL      Platelets 264 10*3/mm3      Neutrophil % 84.5 %      Lymphocyte % 9.6 %      Monocyte % 3.7 %      Eosinophil % 1.2 %      Basophil % 0.3 %      Immature Grans % 0.7 %      Neutrophils, Absolute 10.05 10*3/mm3      Lymphocytes, Absolute 1.14 10*3/mm3      Monocytes, Absolute 0.44 10*3/mm3      Eosinophils, Absolute 0.14 10*3/mm3      Basophils, Absolute 0.04 10*3/mm3      Immature Grans, Absolute 0.08 10*3/mm3      nRBC 0.0 /100 WBC     Respiratory Panel PCR w/COVID-19(SARS-CoV-2) [facility] In-House, NP Swab in UTM/VTM, 2 HR TAT - Swab, Nasopharynx [[id number]]  (Normal) Collected: 05/17/25 1152    Specimen: Swab from Nasopharynx Updated: 05/17/25 1244     ADENOVIRUS, PCR Not Detected     Coronavirus 229E Not Detected     Coronavirus HKU1 Not Detected     Coronavirus NL63 Not Detected     Coronavirus OC43 Not Detected     COVID19 Not Detected     Human Metapneumovirus Not Detected     Human Rhinovirus/Enterovirus Not Detected     Influenza A PCR Not Detected     Influenza B PCR Not Detected     Parainfluenza Virus 1 Not Detected     Parainfluenza Virus 2 Not Detected     Parainfluenza Virus 3 Not Detected     Parainfluenza Virus 4 Not Detected     RSV, PCR Not Detected     Bordetella pertussis pcr Not Detected     Bordetella parapertussis PCR Not Detected     Chlamydophila pneumoniae PCR Not Detected     Mycoplasma pneumo by PCR Not Detected    Narrative:      In  the setting of a positive respiratory panel with a viral infection PLUS a negative procalcitonin without other underlying concern for bacterial infection, consider observing off antibiotics or discontinuation of antibiotics and continue supportive care. If the respiratory panel is positive for atypical bacterial infection (Bordetella pertussis, Chlamydophila pneumoniae, or Mycoplasma pneumoniae), consider antibiotic de-escalation to target atypical bacterial infection.             Medications   sodium chloride 0.9 % flush 10 mL (has no administration in time range)   sodium chloride 0.9 % flush 10 mL (has no administration in time range)   sodium chloride 0.9 % bolus 1,000 mL (0 mL Intravenous Stopped 5/17/25 1415)   metoclopramide (REGLAN) injection 5 mg (5 mg Intravenous Given 5/17/25 1202)   diphenhydrAMINE (BENADRYL) injection 12.5 mg (12.5 mg Intravenous Given 5/17/25 1202)   Lidocaine HCl gel (XYLOCAINE) urethral/mucosal syringe ( Topical Given 5/17/25 1445)   acetaminophen (TYLENOL) tablet 1,000 mg (1,000 mg Oral Given 5/17/25 1444)   magnesium sulfate in D5W 1g/100mL (PREMIX) (1 g Intravenous New Bag 5/17/25 1444)        Appropriate PPE worn during patient exam.  Appropriate monitoring initiated. Patient is alert and oriented x3.  No acute distress noted. Patient is alert and oriented x3. No acute distress.  S1-S2 heart sounds on exam.  Lungs are clear to auscultation without wheezing, rales or rhonchi. Neuro exam reveals numbness of the right cheek, otherwise nonfocal exam.  VSS.  IV established and labs obtained.  Patient was given normal saline 1 L bolus, Reglan 5 mg IV, and Benadryl 12.5 mg IV white blood cell count 11,890 with a left shift platelets 264 hemoglobin 11.5 hematocrit 35.4 CMP essentially unremarkable sed rate slightly elevated at 29 CRP slightly elevated at 0.97.  Respiratory panel negative.  After speaking with neurology, orders placed for MRI of the brain without and MRV of the brain  without.  Patient reports her headache persist.  Spoke with Saul, pharmacist.  He recommends SPG block with topical lidocaine applied to the tips of nasal cannula.  Spoke with neurology who recommends magnesium 1 g IV.  Upon reassessment, patient reports some relief with this but symptoms persist.  MRV and MRA's pending.  Care of patient was turned over to Marlin PA pending results and intended admission for intractable headache.    I reviewed chart for 925 patient was seen at this facility for pregnancy. My radiology interpretation of MRI of the brain shows no mass, shift, thrombus. Imaging was independently interpreted by Dr. Bhatti. Differential Diagnoses, not all-inclusive and does not constitute entirety of all causes: Intracranial hemorrhage, meningitis, mass, cerebral venous thrombosis.  Intracranial hemorrhage mass ruled out by MRI.  Meningitis ruled out by exam.  Unable to rule out central venous thrombosis MRV is pending upon transfer care.    Disposition/Treatment: Patient was stable upon transfer of care to [facility].     Upon reassessment, patient is flesh tone warm and dry no acute distress noted.  Vital signs are stable.     Part of this note may be an electronic transcription/translation of spoken language to printed text using the Dragon Dictation System.     Final diagnoses:   Acute intractable headache, unspecified headache type       ED Disposition  ED Disposition       ED Disposition   Intended Admit    Condition   --    Comment   --               No follow-up provider specified.       Medication List      No changes were made to your prescriptions during this visit.            Hazel Merida, APRN  05/17/25 2394

## 2025-05-18 VITALS
HEART RATE: 80 BPM | TEMPERATURE: 98 F | SYSTOLIC BLOOD PRESSURE: 100 MMHG | HEIGHT: 63 IN | RESPIRATION RATE: 24 BRPM | OXYGEN SATURATION: 99 % | BODY MASS INDEX: 43.36 KG/M2 | WEIGHT: 244.71 LBS | DIASTOLIC BLOOD PRESSURE: 64 MMHG

## 2025-05-18 LAB
ANION GAP SERPL CALCULATED.3IONS-SCNC: 11 MMOL/L (ref 5–15)
BASOPHILS # BLD AUTO: 0.03 10*3/MM3 (ref 0–0.2)
BASOPHILS NFR BLD AUTO: 0.3 % (ref 0–1.5)
BUN SERPL-MCNC: 5 MG/DL (ref 6–20)
BUN/CREAT SERPL: 11.4 (ref 7–25)
CALCIUM SPEC-SCNC: 8.3 MG/DL (ref 8.6–10.5)
CHLORIDE SERPL-SCNC: 105 MMOL/L (ref 98–107)
CO2 SERPL-SCNC: 19 MMOL/L (ref 22–29)
CREAT SERPL-MCNC: 0.44 MG/DL (ref 0.57–1)
DEPRECATED RDW RBC AUTO: 41.1 FL (ref 37–54)
EGFRCR SERPLBLD CKD-EPI 2021: 135.3 ML/MIN/1.73
EOSINOPHIL # BLD AUTO: 0.09 10*3/MM3 (ref 0–0.4)
EOSINOPHIL NFR BLD AUTO: 0.9 % (ref 0.3–6.2)
ERYTHROCYTE [DISTWIDTH] IN BLOOD BY AUTOMATED COUNT: 13.1 % (ref 12.3–15.4)
GLUCOSE SERPL-MCNC: 103 MG/DL (ref 65–99)
HCT VFR BLD AUTO: 33.1 % (ref 34–46.6)
HGB BLD-MCNC: 10.7 G/DL (ref 12–15.9)
IMM GRANULOCYTES # BLD AUTO: 0.04 10*3/MM3 (ref 0–0.05)
IMM GRANULOCYTES NFR BLD AUTO: 0.4 % (ref 0–0.5)
LYMPHOCYTES # BLD AUTO: 1.25 10*3/MM3 (ref 0.7–3.1)
LYMPHOCYTES NFR BLD AUTO: 12.2 % (ref 19.6–45.3)
MCH RBC QN AUTO: 28.2 PG (ref 26.6–33)
MCHC RBC AUTO-ENTMCNC: 32.3 G/DL (ref 31.5–35.7)
MCV RBC AUTO: 87.1 FL (ref 79–97)
MONOCYTES # BLD AUTO: 0.51 10*3/MM3 (ref 0.1–0.9)
MONOCYTES NFR BLD AUTO: 5 % (ref 5–12)
NEUTROPHILS NFR BLD AUTO: 8.3 10*3/MM3 (ref 1.7–7)
NEUTROPHILS NFR BLD AUTO: 81.2 % (ref 42.7–76)
NRBC BLD AUTO-RTO: 0 /100 WBC (ref 0–0.2)
PLATELET # BLD AUTO: 259 10*3/MM3 (ref 140–450)
PMV BLD AUTO: 10.1 FL (ref 6–12)
POTASSIUM SERPL-SCNC: 4.3 MMOL/L (ref 3.5–5.2)
RBC # BLD AUTO: 3.8 10*6/MM3 (ref 3.77–5.28)
SODIUM SERPL-SCNC: 135 MMOL/L (ref 136–145)
WBC NRBC COR # BLD AUTO: 10.22 10*3/MM3 (ref 3.4–10.8)

## 2025-05-18 PROCEDURE — 80048 BASIC METABOLIC PNL TOTAL CA: CPT | Performed by: INTERNAL MEDICINE

## 2025-05-18 PROCEDURE — 85025 COMPLETE CBC W/AUTO DIFF WBC: CPT | Performed by: INTERNAL MEDICINE

## 2025-05-18 PROCEDURE — G0378 HOSPITAL OBSERVATION PER HR: HCPCS

## 2025-05-18 PROCEDURE — 96376 TX/PRO/DX INJ SAME DRUG ADON: CPT

## 2025-05-18 PROCEDURE — 25010000002 ONDANSETRON PER 1 MG: Performed by: INTERNAL MEDICINE

## 2025-05-18 RX ORDER — ACETAMINOPHEN 500 MG
500 TABLET ORAL EVERY 6 HOURS PRN
Start: 2025-05-18

## 2025-05-18 RX ADMIN — METOCLOPRAMIDE 10 MG: 10 TABLET ORAL at 09:48

## 2025-05-18 RX ADMIN — METOCLOPRAMIDE 10 MG: 10 TABLET ORAL at 13:14

## 2025-05-18 RX ADMIN — ACETAMINOPHEN 1000 MG: 500 TABLET ORAL at 02:46

## 2025-05-18 RX ADMIN — ONDANSETRON 4 MG: 2 INJECTION, SOLUTION INTRAMUSCULAR; INTRAVENOUS at 02:52

## 2025-05-18 RX ADMIN — ACETAMINOPHEN 1000 MG: 500 TABLET ORAL at 09:48

## 2025-05-18 RX ADMIN — POTASSIUM CHLORIDE 40 MEQ: 1500 TABLET, EXTENDED RELEASE ORAL at 02:46

## 2025-05-18 NOTE — DISCHARGE SUMMARY
Hospitalist Service   DISCHARGE SUMMARY    Patient Name: Amira Akers  : 1995  MRN: [id number]    Date of Admission: 2025  Date of Discharge:  25    Primary Care Physician: Ileana Burris MD      Presenting Problem:   Migraine [G43.909]  Acute intractable headache, unspecified headache type [R51.9]    Active and Resolved Hospital Problems:  Active Hospital Problems    Diagnosis POA    **Migraine [G43.909] Yes      Resolved Hospital Problems   No resolved problems to display.         Hospital Course     Hospital Course:  Amira Akers is a 29 y.o. female who is 9 weeks 6 days pregnant and has no significant past medical history presents to [facility] 2025 with new onset headache ongoing for 72 hours that is been persistent described as a pressure-like feeling that is pan cephalic without radiation associated with photophobia and phonophobia.  Patient has had 1 episode of nausea this morning without emesis. She tried Tylenol over-the-counter which did not help her migraine much.  MRI brain and MRA/MRV head and neck all negative for acute findings.  Neurology evaluation was done patient was started on symptomatic care for the headache and observed overnight.  Patient feels much better today and wants to go home.  No further inpatient workup needed at this time.  Patient is stable to discharge home today with follow-up with PCP as an outpatient.        DISCHARGE Follow Up Recommendations for labs and diagnostics:   Follow-up with PCP as needed    Day of Discharge     Vital Signs:  Temp:  [98.1 °F (36.7 °C)-98.3 °F (36.8 °C)] 98.2 °F (36.8 °C)  Heart Rate:  [85-97] 97  Resp:  [18-24] 22  BP: (100-122)/(50-76) 103/65    Physical Exam:  General: Awake, alert, NAD  Eyes: PERRL, EOMI, conjunctivae are clear  Cardiovascular: Regular rate and rhythm, no murmurs  Respiratory: Clear to auscultation bilaterally, no wheezing or rales, unlabored  breathing  Abdomen: Soft, nontender, positive bowel sounds, no guarding  Neurologic: A&O, CN grossly intact, moves all extremities spontaneously  Musculoskeletal: Normal range of motion, no other gross deformities  Skin: Warm, dry        Pertinent  and/or Most Recent Results     LAB RESULTS:      Lab 05/18/25  0533 05/17/25  1151   WBC 10.22 11.89*   HEMOGLOBIN 10.7* 11.5*   HEMATOCRIT 33.1* 35.4   PLATELETS 259 264   NEUTROS ABS 8.30* 10.05*   IMMATURE GRANS (ABS) 0.04 0.08*   LYMPHS ABS 1.25 1.14   MONOS ABS 0.51 0.44   EOS ABS 0.09 0.14   MCV 87.1 86.8   SED RATE  --  29*   CRP  --  0.97*         Lab 05/18/25  0533 05/17/25  1151   SODIUM 135* 132*   POTASSIUM 4.3 3.6   CHLORIDE 105 100   CO2 19.0* 22.2   ANION GAP 11.0 9.8   BUN 5* 6   CREATININE 0.44* 0.52*   EGFR 135.3 130.0   GLUCOSE 103* 109*   CALCIUM 8.3* 8.9         Lab 05/17/25  1151   TOTAL PROTEIN 7.3   ALBUMIN 4.0   GLOBULIN 3.3   ALT (SGPT) 22   AST (SGOT) 19   BILIRUBIN 0.7   ALK PHOS 82                     Brief Urine Lab Results  (Last result in the past 365 days)        Color   Clarity   Blood   Leuk Est   Nitrite   Protein   CREAT   Urine HCG        04/19/25 0050 Yellow   Clear   Moderate (2+)   Small (1+)   Negative   Negative                 Microbiology Results (last 10 days)       Procedure Component Value - Date/Time    Respiratory Panel PCR w/COVID-19(SARS-CoV-2) [facility]NE/JOEY/TATIANA/[facility] In-House, NP Swab in UTM/VTM, 2 HR TAT - Swab, Nasopharynx [[id number]]  (Normal) Collected: 05/17/25 1152    Lab Status: Final result Specimen: Swab from Nasopharynx Updated: 05/17/25 1244     ADENOVIRUS, PCR Not Detected     Coronavirus 229E Not Detected     Coronavirus HKU1 Not Detected     Coronavirus NL63 Not Detected     Coronavirus OC43 Not Detected     COVID19 Not Detected     Human Metapneumovirus Not Detected     Human Rhinovirus/Enterovirus Not Detected     Influenza A PCR Not Detected     Influenza B PCR Not Detected     Parainfluenza Virus 1 Not  Detected     Parainfluenza Virus 2 Not Detected     Parainfluenza Virus 3 Not Detected     Parainfluenza Virus 4 Not Detected     RSV, PCR Not Detected     Bordetella pertussis pcr Not Detected     Bordetella parapertussis PCR Not Detected     Chlamydophila pneumoniae PCR Not Detected     Mycoplasma pneumo by PCR Not Detected    Narrative:      In the setting of a positive respiratory panel with a viral infection PLUS a negative procalcitonin without other underlying concern for bacterial infection, consider observing off antibiotics or discontinuation of antibiotics and continue supportive care. If the respiratory panel is positive for atypical bacterial infection (Bordetella pertussis, Chlamydophila pneumoniae, or Mycoplasma pneumoniae), consider antibiotic de-escalation to target atypical bacterial infection.            US Ob Limited 1 + Fetuses  Result Date: 5/18/2025  Impression: Impression: Single intrauterine gestation with confirmed cardiac activity. Electronically Signed: Akil Fatima MD  5/18/2025 9:32 AM EDT  Workstation ID: [id number]    MRI Angiogram Neck Without Contrast  Result Date: 5/17/2025  Impression: Impression: Major arterial vasculature within neck appears widely patent, with no hemodynamically significant stenosis or dissection. Electronically Signed: Oscar Robles MD  5/17/2025 5:20 PM EDT  Workstation ID: [id number]    MRI Angiogram Head Without Contrast  Result Date: 5/17/2025  Impression: Impression: Major arterial vasculature within head appears widely patent, with no thrombus or aneurysm. Electronically Signed: Oscar Robles MD  5/17/2025 5:16 PM EDT  Workstation ID: [id number]    MRI Angiogram Venogram Head  Result Date: 5/17/2025  Impression: Impression: Examination appears within normal limits. Electronically Signed: Oscar Robles MD  5/17/2025 5:10 PM EDT  Workstation ID: [id number]    MRI Brain Without Contrast  Result Date: 5/17/2025  Impression: Impression: No acute  intracranial abnormality. No evidence of mass, hemorrhage, or hydrocephalus. Electronically Signed: Raul Garnett MD  5/17/2025 3:10 PM EDT  Workstation ID: [id number]    US Ob Transvaginal  Result Date: 4/19/2025  Impression: Impression: Gestational sac and yolk sac seen without embryo. Small suspected perigestational hemorrhage of less than 20% circumference of the gestational sac. Complex left ovarian lesion could be related to corpus lutein cyst. Follow-up beta hCG levels and consideration of repeat ultrasound would be recommended. Electronically Signed: Milan Rider MD  4/19/2025 12:52 AM EDT  Workstation ID: [id number]    US Ob < 14 Weeks Single or First Gestation  Result Date: 4/19/2025  Impression: Impression: Gestational sac and yolk sac seen without embryo. Small suspected perigestational hemorrhage of less than 20% circumference of the gestational sac. Complex left ovarian lesion could be related to corpus lutein cyst. Follow-up beta hCG levels and consideration of repeat ultrasound would be recommended. Electronically Signed: Milan Rider MD  4/19/2025 12:52 AM EDT  Workstation ID: [id number]                  Labs Pending at Discharge:      Procedures Performed           Consults:   Consults       Date and Time Order Name Status Description    5/17/2025  7:01 PM Inpatient Obstetrics / Gynecology Consult      5/17/2025  5:35 PM Hospitalist (on-call MD unless specified)      5/17/2025 11:45 AM Inpatient Neurology Consult General Completed               Discharge Details        Discharge Medications        New Medications        Instructions Start Date   acetaminophen 500 MG tablet  Commonly known as: TYLENOL   500 mg, Oral, Every 6 Hours PRN               Allergies   Allergen Reactions    Penicillins Hives    Amoxicillin Unknown - High Severity         Discharge Disposition:  Home or Self Care    Diet:  Hospital:  Diet Order   Procedures    Diet: Regular/House; Fluid Consistency: Thin (IDDSI 0)          Discharge Activity:         CODE STATUS:  Code Status and Medical Interventions: CPR (Attempt to Resuscitate); Full Support   Ordered at: 05/17/25 1812     Code Status (Patient has no pulse and is not breathing):    CPR (Attempt to Resuscitate)     Medical Interventions (Patient has pulse or is breathing):    Full Support     Level Of Support Discussed With:    Patient         No future appointments.        Time spent on Discharge including face to face service:  35 minutes    Signature:    Electronically signed by Dwight Eastman DO, 05/18/25, 10:17 AM EDT.      Part of this note may be an electronic transcription/translation of spoken language to printed text using the Dragon Dictation System.

## 2025-05-18 NOTE — NURSING NOTE
When completing bedside swallow, answer questioned appropriately but screen failed patient without reason. Refreshed EPIC and redone with pass noted.

## 2025-05-18 NOTE — PROGRESS NOTES
LOS: 0 days     Chief Complaint: Status migrainosus       SUBJECTIVE:    History taken from: patient chart    Interval History: Amira Akers was admitted on 5/17/2025.  No new acute events overnight.  Patient received a SPG block yesterday that seems to have helped.  Patient's numbness has resolved.  She reports her headache is a 4 out of 10 and is sitting upright in bed ready be discharged.    Advanced neuroimaging unremarkable.      - Portions of the above HPI were copied from previous encounters and edited as appropriate.    Patient Complaints: none      Review of Systems   Constitutional:  Negative for fatigue and fever.   HENT:  Negative for ear discharge, ear pain, tinnitus, trouble swallowing and voice change.    Eyes:  Negative for photophobia, pain and visual disturbance.   Respiratory:  Negative for chest tightness and shortness of breath.    Cardiovascular:  Negative for chest pain.   Gastrointestinal:  Negative for nausea and vomiting.   Musculoskeletal:  Negative for back pain, neck pain and neck stiffness.   Neurological:  Negative for dizziness, tremors, seizures, syncope, facial asymmetry, speech difficulty, weakness, light-headedness, numbness and headaches.   All other systems reviewed and are negative.         Pertinent PMH:  has no past medical history on file.   ________________________________________________     OBJECTIVE:      GEN: NAD, pleasant, cooperative  CHEST: No signs of resp distress, on room air    NEURO  MENTAL STATUS: AAOx3, memory intact, fund of knowledge appropriate  LANG/SPEECH: Naming and repetition intact, fluent, follows 3-step commands    CRANIAL NERVES:  II-XII grossly intact    MOTOR:  Motor strength 5/5 throughout, symmetric.     REFLEXES: 2/4 throughout    SENSORY:  Normal to touch, temp all limbs  No hemineglect, no extinction to double-sided stimulation (visual & tactile)  COORD: Normal finger to nose          NIH Stroke Scale  Interval:  baseline  1a. Level of Consciousness: 0-->Alert, keenly responsive  1b. LOC Questions: 0-->Answers both questions correctly  1c. LOC Commands: 0-->Performs both tasks correctly  2. Best Gaze: 0-->Normal  3. Visual: 0-->No visual loss  4. Facial Palsy: 0-->Normal symmetrical movements  5a. Motor Arm, Left: 0-->No drift, limb holds 90 (or 45) degrees for full 10 secs  5b. Motor Arm, Right: 0-->No drift, limb holds 90 (or 45) degrees for full 10 secs  6a. Motor Leg, Left: 0-->No drift, leg holds 30 degree position for full 5 secs  6b. Motor Leg, Right: 0-->No drift, leg holds 30 degree position for full 5 secs  7. Limb Ataxia: 0-->Absent  8. Sensory: 0-->Normal, no sensory loss  9. Best Language: 0-->No aphasia, normal  10. Dysarthria: 0-->Normal  11. Extinction and Inattention (formerly Neglect): 0-->No abnormality  Total (NIH Stroke Scale): 0         ________________________________________________   RESULTS REVIEW    VITAL SIGNS:  Temp:  [98 °F (36.7 °C)-98.3 °F (36.8 °C)] 98 °F (36.7 °C)  Heart Rate:  [80-97] 80  Resp:  [19-24] 24  BP: (100-122)/(55-76) 100/64    LABS:       Lab 05/18/25 0533 05/17/25  1151   WBC 10.22 11.89*   HEMOGLOBIN 10.7* 11.5*   HEMATOCRIT 33.1* 35.4   PLATELETS 259 264   NEUTROS ABS 8.30* 10.05*   IMMATURE GRANS (ABS) 0.04 0.08*   LYMPHS ABS 1.25 1.14   MONOS ABS 0.51 0.44   EOS ABS 0.09 0.14   MCV 87.1 86.8   SED RATE  --  29*   CRP  --  0.97*         Lab 05/18/25 0533 05/17/25  1151   SODIUM 135* 132*   POTASSIUM 4.3 3.6   CHLORIDE 105 100   CO2 19.0* 22.2   ANION GAP 11.0 9.8   BUN 5* 6   CREATININE 0.44* 0.52*   EGFR 135.3 130.0   GLUCOSE 103* 109*   CALCIUM 8.3* 8.9         Lab 05/17/25  1151   TOTAL PROTEIN 7.3   ALBUMIN 4.0   GLOBULIN 3.3   ALT (SGPT) 22   AST (SGOT) 19   BILIRUBIN 0.7   ALK PHOS 82                     UA          4/9/2025    12:00 4/19/2025    00:50   Urinalysis   Squamous Epithelial Cells, UA 0-2  0-2    Specific Gravity, UA 1.016  1.024    Ketones, UA Negative   "Negative    Blood, UA Moderate (2+)  Moderate (2+)    Leukocytes, UA Negative  Small (1+)    Nitrite, UA Negative  Negative    RBC, UA 6-10  21-50    WBC, UA 0-2  6-10    Bacteria, UA None Seen  Trace        No results found for: \"TSH\", \"LDL\", \"HGBA1C\", \"DYCGSAEJ90\", \"PHENYTOIN\", \"PHENOBARB\", \"VALPROATE\", \"CBMZ\"      IMAGING STUDIES:  US Ob Limited 1 + Fetuses  Result Date: 5/18/2025  Impression: Single intrauterine gestation with confirmed cardiac activity. Electronically Signed: Akil Fatima MD  5/18/2025 9:32 AM EDT  Workstation ID: [id number]    MRI Angiogram Neck Without Contrast  Result Date: 5/17/2025  Impression: Major arterial vasculature within neck appears widely patent, with no hemodynamically significant stenosis or dissection. Electronically Signed: Oscar Robles MD  5/17/2025 5:20 PM EDT  Workstation ID: [id number]    MRI Angiogram Head Without Contrast  Result Date: 5/17/2025  Impression: Major arterial vasculature within head appears widely patent, with no thrombus or aneurysm. Electronically Signed: Oscar Robles MD  5/17/2025 5:16 PM EDT  Workstation ID: [id number]    MRI Angiogram Venogram Head  Result Date: 5/17/2025  Impression: Examination appears within normal limits. Electronically Signed: Oscar Robles MD  5/17/2025 5:10 PM EDT  Workstation ID: [id number]    MRI Brain Without Contrast  Result Date: 5/17/2025  Impression: No acute intracranial abnormality. No evidence of mass, hemorrhage, or hydrocephalus. Electronically Signed: Raul Garnett MD  5/17/2025 3:10 PM EDT  Workstation ID: [id number]      I reviewed the patient's new clinical results.    ________________________________________________      PROBLEM LIST:    Migraine        ASSESSMENT/PLAN:  9 weeks and 6 days pregnant  Acute onset pancephalic pressure-like headache ongoing for greater than 72 hours  Right facial numbness along the V2 distribution ongoing since 5/16/2025  Tender bilateral maxillary sinus  Differential " includes but not limited to: Tension type headache, acute migraine, acute sinusitis, CVST, AIS, demyelinating disease, secondary headache     No nuchal rigidity, AO x 3, no concern for CNS infection at this time.     Significant proven and headache, patient appears to be back to baseline no paresthesias noted in the face arms or legs.    NIHSS 0       Neuroimaging to include: MRI Brain WO, MRV, MRA H/N unremarkable  Nonpharmacologic options: ice pack or cold towel  Tylenol 1000 mg q6h PRN. If this does not help, can escalate to acetaminophen 1000 mg plus metoclopramide 10 mg OR benadryl 25 mg and metoclopramide 10 mg.   NS 1 liter over 2 to 4 hours. This can be repeated every 6-12h  Magnesium sulfate 1,000 mg IV infusion over 1 hour. This can be repeated every 8-12 hours.  Sumatriptan is an option, however this does carry a theoretic possibility of vasoconstriction of uteroplacental vessels and/or increased uterotonic activity. Some data shows that it is okay to use (category C).  Risks and benefits would need to be discussed with the patient.  Initial dose can be used at 50 mg PO x1 or 4 mg SQ x1.  Recommend patient tablets with her PCP and or a neurologist for further migraine management.  Patient is cleared for discharge from a neurostandpoint.    I will sign off at this time but am happy to re-evaluate should any new neurological issues arise or should further input be deemed helpful.    Medication benefits and potential side effects were discussed with the patient and they verbalized their understanding.     Patient was instructed to immediately call 911 or return to the closest ER with any any new weakness, numbness, speech difficulty or vision disturbance.       **Please refer to previous notes for further details and recommendations.     I discussed the patient's findings and my recommendations with patient, nursing staff, and primary care team    Emiliano Beckett MD  05/18/25  13:58 EDT

## 2025-05-18 NOTE — CONSULTS
I received consult for OB with patient admitted for migraines in pregnancy yesterday evening.   My plan was to evaluate patient today, but she was discharged home.   Yesterday I ordered US, with normal FHTs noted.   I also reviewed MAR, and all medications received safe for pregnancy and medications I would recommend for treating headache.   When talking to nurse yesterday, patient had no pregnancy related concerns.   FU for routine primary OB visits.

## 2025-05-18 NOTE — PLAN OF CARE
Problem: Adult Inpatient Plan of Care  Goal: Plan of Care Review  Outcome: Progressing  Goal: Patient-Specific Goal (Individualized)  Outcome: Progressing  Goal: Absence of Hospital-Acquired Illness or Injury  Outcome: Progressing  Intervention: Identify and Manage Fall Risk  Recent Flowsheet Documentation  Taken 5/18/2025 0400 by Mersmann, Kinsey, LPN  Safety Promotion/Fall Prevention:   safety round/check completed   nonskid shoes/slippers when out of bed   lighting adjusted   activity supervised   assistive device/personal items within reach   clutter free environment maintained  Taken 5/18/2025 0000 by Mersmann, Kinsey, LPN  Safety Promotion/Fall Prevention:   safety round/check completed   nonskid shoes/slippers when out of bed   lighting adjusted   activity supervised   assistive device/personal items within reach   clutter free environment maintained  Intervention: Prevent Skin Injury  Recent Flowsheet Documentation  Taken 5/18/2025 0400 by Kinsey Stephens LPN  Body Position: position changed independently  Taken 5/17/2025 2100 by Kinsey Stephens LPN  Body Position: position changed independently  Goal: Optimal Comfort and Wellbeing  Outcome: Progressing  Intervention: Provide Person-Centered Care  Recent Flowsheet Documentation  Taken 5/17/2025 2100 by Kinsey Stephens LPN  Trust Relationship/Rapport:   care explained   choices provided   emotional support provided   empathic listening provided   questions answered   questions encouraged   reassurance provided   thoughts/feelings acknowledged  Goal: Readiness for Transition of Care  Outcome: Progressing     Problem: Electrolyte Imbalance  Goal: Electrolyte Balance  Outcome: Progressing   Goal Outcome Evaluation:

## 2025-07-12 ENCOUNTER — APPOINTMENT (OUTPATIENT)
Dept: ULTRASOUND IMAGING | Facility: HOSPITAL | Age: 30
End: 2025-07-12
Payer: MEDICAID

## 2025-07-12 ENCOUNTER — HOSPITAL ENCOUNTER (EMERGENCY)
Facility: HOSPITAL | Age: 30
Discharge: HOME | End: 2025-07-12
Payer: MEDICAID

## 2025-07-12 VITALS
HEART RATE: 89 BPM | HEIGHT: 62 IN | SYSTOLIC BLOOD PRESSURE: 124 MMHG | RESPIRATION RATE: 17 BRPM | BODY MASS INDEX: 44.35 KG/M2 | DIASTOLIC BLOOD PRESSURE: 77 MMHG | TEMPERATURE: 98.3 F | OXYGEN SATURATION: 99 % | WEIGHT: 241 LBS

## 2025-07-12 DIAGNOSIS — R10.2 PELVIC PAIN AFFECTING PREGNANCY IN SECOND TRIMESTER, ANTEPARTUM: ICD-10-CM

## 2025-07-12 DIAGNOSIS — N76.0 BV (BACTERIAL VAGINOSIS): Primary | ICD-10-CM

## 2025-07-12 DIAGNOSIS — O26.852 SPOTTING AFFECTING PREGNANCY IN SECOND TRIMESTER: ICD-10-CM

## 2025-07-12 DIAGNOSIS — O26.892 PELVIC PAIN AFFECTING PREGNANCY IN SECOND TRIMESTER, ANTEPARTUM: ICD-10-CM
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
## 2025-07-12 DIAGNOSIS — E87.6 ACUTE HYPOKALEMIA: ICD-10-CM

## 2025-07-12 DIAGNOSIS — B96.89 BV (BACTERIAL VAGINOSIS): Primary | ICD-10-CM

## 2025-07-12 LAB
ALBUMIN SERPL-MCNC: 4 G/DL (ref 3.5–5.2)
ALBUMIN/GLOB SERPL: 1.4 G/DL
ALP SERPL-CCNC: 83 U/L (ref 39–117)
ALT SERPL W P-5'-P-CCNC: 14 U/L (ref 1–33)
ANION GAP SERPL CALCULATED.3IONS-SCNC: 11.3 MMOL/L (ref 5–15)
AST SERPL-CCNC: 20 U/L (ref 1–32)
BACTERIA UR QL AUTO: NORMAL /HPF
BASOPHILS # BLD AUTO: 0.02 10*3/MM3 (ref 0–0.2)
BASOPHILS NFR BLD AUTO: 0.2 % (ref 0–1.5)
BILIRUB SERPL-MCNC: 0.6 MG/DL (ref 0–1.2)
BILIRUB UR QL STRIP: NEGATIVE
BUN SERPL-MCNC: 6.5 MG/DL (ref 6–20)
BUN/CREAT SERPL: 12.7 (ref 7–25)
CALCIUM SPEC-SCNC: 9.1 MG/DL (ref 8.6–10.5)
CHLORIDE SERPL-SCNC: 101 MMOL/L (ref 98–107)
CLARITY UR: ABNORMAL
CLUE CELLS SPEC QL WET PREP: ABNORMAL
CO2 SERPL-SCNC: 20.7 MMOL/L (ref 22–29)
COD CRY URNS QL: NORMAL /HPF
COLOR UR: ABNORMAL
CREAT SERPL-MCNC: 0.51 MG/DL (ref 0.57–1)
DEPRECATED RDW RBC AUTO: 40.3 FL (ref 37–54)
EGFRCR SERPLBLD CKD-EPI 2021: 130.6 ML/MIN/1.73
EOSINOPHIL # BLD AUTO: 0.08 10*3/MM3 (ref 0–0.4)
EOSINOPHIL NFR BLD AUTO: 0.8 % (ref 0.3–6.2)
ERYTHROCYTE [DISTWIDTH] IN BLOOD BY AUTOMATED COUNT: 12.9 % (ref 12.3–15.4)
GLOBULIN UR ELPH-MCNC: 2.9 GM/DL
GLUCOSE SERPL-MCNC: 100 MG/DL (ref 65–99)
GLUCOSE UR STRIP-MCNC: NEGATIVE MG/DL
HCG INTACT+B SERPL-ACNC: 9520 MIU/ML
HCT VFR BLD AUTO: 32.7 % (ref 34–46.6)
HGB BLD-MCNC: 11 G/DL (ref 12–15.9)
HGB F BLD QL KLEIH BETKE: NORMAL
HGB UR QL STRIP.AUTO: ABNORMAL
HYALINE CASTS UR QL AUTO: NORMAL /LPF
HYDATID CYST SPEC WET PREP: ABNORMAL
IMM GRANULOCYTES # BLD AUTO: 0.04 10*3/MM3 (ref 0–0.05)
IMM GRANULOCYTES NFR BLD AUTO: 0.4 % (ref 0–0.5)
KETONES UR QL STRIP: ABNORMAL
LEUKOCYTE ESTERASE UR QL STRIP.AUTO: ABNORMAL
LYMPHOCYTES # BLD AUTO: 2.09 10*3/MM3 (ref 0.7–3.1)
LYMPHOCYTES NFR BLD AUTO: 19.8 % (ref 19.6–45.3)
MCH RBC QN AUTO: 28.9 PG (ref 26.6–33)
MCHC RBC AUTO-ENTMCNC: 33.6 G/DL (ref 31.5–35.7)
MCV RBC AUTO: 85.8 FL (ref 79–97)
MONOCYTES # BLD AUTO: 0.72 10*3/MM3 (ref 0.1–0.9)
MONOCYTES NFR BLD AUTO: 6.8 % (ref 5–12)
NEUTROPHILS NFR BLD AUTO: 7.59 10*3/MM3 (ref 1.7–7)
NEUTROPHILS NFR BLD AUTO: 72 % (ref 42.7–76)
NITRITE UR QL STRIP: NEGATIVE
NRBC BLD AUTO-RTO: 0 /100 WBC (ref 0–0.2)
PH UR STRIP.AUTO: 6 [PH] (ref 5–8)
PLATELET # BLD AUTO: 260 10*3/MM3 (ref 140–450)
PMV BLD AUTO: 10.6 FL (ref 6–12)
POTASSIUM SERPL-SCNC: 3.4 MMOL/L (ref 3.5–5.2)
PROT SERPL-MCNC: 6.9 G/DL (ref 6–8.5)
PROT UR QL STRIP: ABNORMAL
RBC # BLD AUTO: 3.81 10*6/MM3 (ref 3.77–5.28)
RBC # UR STRIP: NORMAL /HPF
REF LAB TEST METHOD: NORMAL
SODIUM SERPL-SCNC: 133 MMOL/L (ref 136–145)
SP GR UR STRIP: 1.03 (ref 1–1.03)
SQUAMOUS #/AREA URNS HPF: NORMAL /HPF
T VAGINALIS SPEC QL WET PREP: ABNORMAL
UROBILINOGEN UR QL STRIP: ABNORMAL
WBC # UR STRIP: NORMAL /HPF
WBC NRBC COR # BLD AUTO: 10.54 10*3/MM3 (ref 3.4–10.8)
WBC SPEC QL WET PREP: ABNORMAL
YEAST GENITAL QL WET PREP: ABNORMAL

## 2025-07-12 PROCEDURE — 87210 SMEAR WET MOUNT SALINE/INK: CPT | Performed by: NURSE PRACTITIONER

## 2025-07-12 PROCEDURE — 99284 EMERGENCY DEPT VISIT MOD MDM: CPT

## 2025-07-12 PROCEDURE — 85460 HEMOGLOBIN FETAL: CPT | Performed by: NURSE PRACTITIONER

## 2025-07-12 PROCEDURE — 81001 URINALYSIS AUTO W/SCOPE: CPT | Performed by: NURSE PRACTITIONER

## 2025-07-12 PROCEDURE — 80053 COMPREHEN METABOLIC PANEL: CPT | Performed by: NURSE PRACTITIONER

## 2025-07-12 PROCEDURE — 76805 OB US >/= 14 WKS SNGL FETUS: CPT

## 2025-07-12 PROCEDURE — 84702 CHORIONIC GONADOTROPIN TEST: CPT | Performed by: NURSE PRACTITIONER

## 2025-07-12 PROCEDURE — 85025 COMPLETE CBC W/AUTO DIFF WBC: CPT | Performed by: NURSE PRACTITIONER

## 2025-07-12 RX ORDER — SODIUM CHLORIDE 0.9 % (FLUSH) 0.9 %
10 SYRINGE (ML) INJECTION AS NEEDED
Status: DISCONTINUED | OUTPATIENT
Start: 2025-07-12 | End: 2025-07-12 | Stop reason: HOSPADM

## 2025-07-12 RX ORDER — POTASSIUM CHLORIDE 1500 MG/1
40 TABLET, EXTENDED RELEASE ORAL ONCE
Status: COMPLETED | OUTPATIENT
Start: 2025-07-12 | End: 2025-07-12

## 2025-07-12 RX ORDER — METRONIDAZOLE 500 MG/1
500 TABLET ORAL 2 TIMES DAILY
Qty: 14 TABLET | Refills: 0 | Status: SHIPPED | OUTPATIENT
Start: 2025-07-12 | End: 2025-07-19

## 2025-07-12 RX ADMIN — POTASSIUM CHLORIDE 40 MEQ: 1500 TABLET, EXTENDED RELEASE ORAL at 18:51

## 2025-07-12 NOTE — ED PROVIDER NOTES
Subjective   History of Present Illness  29-year-old  female with no significant medical history of lower abdominal pain after falling on her abdomen on Thursday.  She reports that she is 18 weeks gestation.  She reports that she had been feeling fetal movement, although this decreased last night and she has not felt the baby move today.  She also reports that she began having vaginal spotting last night.    History provided by:  Patient      Review of Systems   Constitutional:  Negative for appetite change, chills, diaphoresis and fever.   Gastrointestinal:  Negative for abdominal pain, blood in stool, constipation, diarrhea, nausea and vomiting.   Genitourinary:  Positive for pelvic pain, vaginal bleeding and vaginal pain. Negative for decreased urine volume, difficulty urinating, flank pain and hematuria.   Skin:  Negative for pallor and rash.   Hematological:  Negative for adenopathy.   All other systems reviewed and are negative.      History reviewed. No pertinent past medical history.    Allergies   Allergen Reactions    Penicillins Hives    Amoxicillin Unknown - High Severity       History reviewed. No pertinent surgical history.    History reviewed. No pertinent family history.    Social History     Socioeconomic History    Marital status: Single   Tobacco Use    Smoking status: Never   Vaping Use    Vaping status: Never Used   Substance and Sexual Activity    Alcohol use: Not Currently     Comment: occ    Drug use: Not Currently    Sexual activity: Defer           Objective   Physical Exam  Vitals and nursing note reviewed. Exam conducted with a chaperone present (NED Isaac).   Constitutional:       General: She is awake. She is not in acute distress.     Appearance: Normal appearance. She is well-developed and normal weight. She is not ill-appearing, toxic-appearing or diaphoretic.   HENT:      Mouth/Throat:      Mouth: Mucous membranes are moist.   Eyes:      General: No scleral  icterus.  Cardiovascular:      Rate and Rhythm: Normal rate and regular rhythm.      Pulses: Normal pulses.      Heart sounds: Normal heart sounds, S1 normal and S2 normal. Heart sounds not distant. No murmur heard.     No friction rub. No gallop.   Pulmonary:      Effort: Pulmonary effort is normal.      Breath sounds: Normal breath sounds and air entry.   Abdominal:      General: Abdomen is flat. Bowel sounds are normal. There is no distension.      Palpations: Abdomen is soft. There is no mass.      Tenderness: There is no abdominal tenderness. There is no right CVA tenderness, left CVA tenderness, guarding or rebound.      Hernia: No hernia is present.   Genitourinary:     Vagina: Normal.      Cervix: Normal. No erythema.      Uterus: Normal.       Adnexa: Right adnexa normal.        Right: No mass, tenderness or fullness.          Left: Tenderness present. No mass or fullness.     Skin:     General: Skin is warm and dry.      Capillary Refill: Capillary refill takes less than 2 seconds.      Coloration: Skin is not jaundiced or pale.      Findings: No bruising or rash.   Neurological:      Mental Status: She is alert and oriented to person, place, and time.   Psychiatric:         Behavior: Behavior is cooperative.         Procedures           ED Course  ED Course as of 07/12/25 2043   Sat Jul 12, 2025 1857 Awaiting ultrasound results. [AL]   1953 Awaiting US results. [AL]      ED Course User Index  [AL] Hazel Merida, APRN                                                       Medical Decision Making  Problems Addressed:  Acute hypokalemia: complicated acute illness or injury  BV (bacterial vaginosis): complicated acute illness or injury  Fall, initial encounter: complicated acute illness or injury  Pelvic pain affecting pregnancy in second trimester, antepartum: complicated acute illness or injury  Spotting affecting pregnancy in second trimester: complicated acute illness or injury    Amount and/or  "Complexity of Data Reviewed  Labs: ordered. Decision-making details documented in ED Course.  Radiology: ordered. Decision-making details documented in ED Course.    Risk  Prescription drug management.    Interpreted by radiologist as below:     US Ob 14 + Weeks Single or First Gestation  Result Date: 7/12/2025  Impression: Limited fetal ultrasound is within the range of normal. Fetal movement is normal and cardiac motion is normal at 150 bpm. If the patient continues to experience diminished fetal movements, dedicated Level 3 ultrasound would be recommended. Electronically Signed: Milan Rider MD  7/12/2025 8:14 PM EDT  Workstation ID: [id number]        /55   Pulse 84   Temp 98.3 °F (36.8 °C) (Oral)   Resp 17   Ht 157.5 cm (62\")   Wt 109 kg (241 lb)   LMP 03/08/2025 (Exact Date)   SpO2 99%   BMI 44.08 kg/m²      Lab Results (last 24 hours)       Procedure Component Value Units Date/Time    CBC & Differential [[id number]]  (Abnormal) Collected: 07/12/25 1655    Specimen: Blood from Arm, Left Updated: 07/12/25 1706    Narrative:      The following orders were created for panel order CBC & Differential.  Procedure                               Abnormality         Status                     ---------                               -----------         ------                     CBC Auto Differential[[id number]]        Abnormal            Final result                 Please view results for these tests on the individual orders.    Comprehensive Metabolic Panel [[id number]]  (Abnormal) Collected: 07/12/25 1655    Specimen: Blood from Arm, Left Updated: 07/12/25 0248     Glucose 100 mg/dL      BUN 6.5 mg/dL      Creatinine 0.51 mg/dL      Sodium 133 mmol/L      Potassium 3.4 mmol/L      Chloride 101 mmol/L      CO2 20.7 mmol/L      Calcium 9.1 mg/dL      Total Protein 6.9 g/dL      Albumin 4.0 g/dL      ALT (SGPT) 14 U/L      AST (SGOT) 20 U/L      Alkaline Phosphatase 83 U/L      Total Bilirubin 0.6 mg/dL      " Globulin 2.9 gm/dL      A/G Ratio 1.4 g/dL      BUN/Creatinine Ratio 12.7     Anion Gap 11.3 mmol/L      eGFR 130.6 mL/min/1.73     Narrative:      GFR Categories in Chronic Kidney Disease (CKD)              GFR Category          GFR (mL/min/1.73)    Interpretation  G1                    90 or greater        Normal or high (1)  G2                    60-89                Mild decrease (1)  G3a                   45-59                Mild to moderate decrease  G3b                   30-44                Moderate to severe decrease  G4                    15-29                Severe decrease  G5                    14 or less           Kidney failure    (1)In the absence of evidence of kidney disease, neither GFR category G1 or G2 fulfill the criteria for CKD.    eGFR calculation 2021 CKD-EPI creatinine equation, which does not include race as a factor    hCG, Quantitative, Pregnancy [[id number]] Collected: 07/12/25 1655    Specimen: Blood from Arm, Left Updated: 07/12/25 1742     HCG Quantitative 9,520.00 mIU/mL     Narrative:      HCG Ranges by Gestational Age    Females - non-pregnant premenopausal   </= 1mIU/mL HCG  Females - postmenopausal               </= 7mIU/mL HCG    3 Weeks       5.4   -      72 mIU/mL  4 Weeks      10.2   -     708 mIU/mL  5 Weeks       217   -   8,245 mIU/mL  6 Weeks       152   -  32,177 mIU/mL  7 Weeks     4,059   - 153,767 mIU/mL  8 Weeks    31,366   - 149,094 mIU/mL  9 Weeks    59,109   - 135,901 mIU/mL  10 Weeks   44,186   - 170,409 mIU/mL  12 Weeks   27,107   - 201,615 mIU/mL  14 Weeks   24,302   -  93,646 mIU/mL  15 Weeks   12,540   -  69,747 mIU/mL  16 Weeks    8,904   -  55,332 mIU/mL  17 Weeks    8,240   -  51,793 mIU/mL  18 Weeks    9,649   -  55,271 mIU/mL      CBC Auto Differential [[id number]]  (Abnormal) Collected: 07/12/25 1655    Specimen: Blood from Arm, Left Updated: 07/12/25 1706     WBC 10.54 10*3/mm3      RBC 3.81 10*6/mm3      Hemoglobin 11.0 g/dL      Hematocrit 32.7 %       MCV 85.8 fL      MCH 28.9 pg      MCHC 33.6 g/dL      RDW 12.9 %      RDW-SD 40.3 fl      MPV 10.6 fL      Platelets 260 10*3/mm3      Neutrophil % 72.0 %      Lymphocyte % 19.8 %      Monocyte % 6.8 %      Eosinophil % 0.8 %      Basophil % 0.2 %      Immature Grans % 0.4 %      Neutrophils, Absolute 7.59 10*3/mm3      Lymphocytes, Absolute 2.09 10*3/mm3      Monocytes, Absolute 0.72 10*3/mm3      Eosinophils, Absolute 0.08 10*3/mm3      Basophils, Absolute 0.02 10*3/mm3      Immature Grans, Absolute 0.04 10*3/mm3      nRBC 0.0 /100 WBC     Wet Prep, Genital - Swab, Vagina [[id number]]  (Abnormal) Collected: 07/12/25 1715    Specimen: Swab from Vagina Updated: 07/12/25 1723     YEAST No yeast seen     HYPHAL ELEMENTS No Hyphal elements seen     WBC'S 4+ WBC's seen     Clue Cells, Wet Prep 3+ Clue cells seen     Trichomonas, Wet Prep No Trichomonas seen    Urinalysis With Microscopic If Indicated (No Culture) - Urine, Clean Catch [[id number]]  (Abnormal) Collected: 07/12/25 1728    Specimen: Urine, Clean Catch Updated: 07/12/25 1749     Color, UA Dark Yellow     Appearance, UA Cloudy     pH, UA 6.0     Specific Gravity, UA 1.032     Glucose, UA Negative     Ketones, UA Trace     Bilirubin, UA Negative     Blood, UA Large (3+)     Protein, UA Trace     Leuk Esterase, UA Trace     Nitrite, UA Negative     Urobilinogen, UA 1.0 E.U./dL    Urinalysis, Microscopic Only - Urine, Clean Catch [[id number]] Collected: 07/12/25 1728    Specimen: Urine, Clean Catch Updated: 07/12/25 1801     RBC, UA None Seen /HPF      WBC, UA 0-2 /HPF      Bacteria, UA None Seen /HPF      Squamous Epithelial Cells, UA 0-2 /HPF      Hyaline Casts, UA None Seen /LPF      Calcium Oxalate Crystals, UA Moderate/2+ /HPF      Methodology Manual Light Microscopy             Medications   sodium chloride 0.9 % flush 10 mL (has no administration in time range)   potassium chloride (KLOR-CON M20) CR tablet 40 mEq (40 mEq Oral Given 7/12/25 3367)         Appropriate PPE worn during patient exam.  Appropriate monitoring initiated. Patient is alert and oriented x3.  No acute distress noted.  Abdomen is obese, round, tender in the lower pelvis.  IV established and labs obtained.  Pelvic exam performed, see above.  Pregnancy ultrasound obtained with the above findings blood cell count 10,540 platelets 260 hemoglobin 11 hematocrit 32.7.  Sodium 133 potassium 3.4 chloride 111 bicarb 20.7 BUN 6.5 creatinine 0.5 glucose 100.  KB stain negative wet prep was significant for 4+ white cells 3+ clue cells.  Urine showed no sign of infection.  Ultrasound showed normal intrauterine pregnancy.  Patient is established with OB/GYN.  She was encouraged to keep her scheduled follow-up call sooner if needed.  Instructed to push fluids and be careful when walking on wet floor in the future.    I reviewed chart 5/17/2025 patient was admitted to this facility for acute intractable headache and pregnancy. My radiology interpretation Fetal movement normal with heart rate of 150 bpm. Imaging was independently interpreted by Dr. Bhatti.  Differential Diagnoses, not all-inclusive and does not constitute entirety of all causes: Threatened fetal demise, abdominal wall contusion, pelvic pain in pregnancy.  Threatened fetal demise ruled out by ultrasound.  Abdominal wall contusion ruled out by ultrasound and exam.  Pelvic pain in pregnancy determined by H&P.    Disposition/Treatment: Discussed results with patient, verbalized understanding. Discussed reasons to return, patient verbalized understanding. Agreeable with plan of care. Patient was stable upon disposition.    Upon reassessment, patient is flesh tone warm and dry no acute distress noted.  Vital signs are stable. Discussed return precautions, patient verbalized understanding.     Part of this note may be an electronic transcription/translation of spoken language to printed text using the Dragon Dictation System.   Final diagnoses:   BV  (bacterial vaginosis)   Pelvic pain affecting pregnancy in second trimester, antepartum   Fall, initial encounter   Spotting affecting pregnancy in second trimester   Acute hypokalemia       ED Disposition  ED Disposition       ED Disposition   Discharge    Condition   Stable    Comment   --               Ileana Burris MD  [facility] [place] [address] [phone]    Schedule an appointment as soon as possible for a visit       [facility] EMERGENCY DEPARTMENT  [facility] [address] [phone]  Go to   If symptoms worsen         Medication List        New Prescriptions      metroNIDAZOLE 500 MG tablet  Commonly known as: FLAGYL  Take 1 tablet by mouth 2 (Two) Times a Day for 7 days.               Where to Get Your Medications        These medications were sent to [facility]/pharmacy #[id number] - [place], [place] - [address] [place] [address] - [phone]  - [phone] [address] [place] [address]      Hours: 24-hours Phone: [phone]   metroNIDAZOLE 500 MG tablet            Hazel Merida, APRN  07/12/25 2049

## 2025-07-12 NOTE — ED NOTES
Pt arrived to ED with c/o pregnancy problems and abdominal pain radiating to her back on her right side. States she is 18 weeks pregnant and fell on Thursday while mopping the floor and fell on her belly. She began having some pink tinged spotting after the fall and has not felt fetal movement since Thursday morning. Serbian is the pt's primary language but does speak english as well.  ipad offered but pt declined.

## 2025-07-13 NOTE — DISCHARGE INSTRUCTIONS
Eat a diet rich in potassium.  Make sure that you are careful when walking on a wet floor.  Make sure that you are drinking at least 120 ounces of water daily change positions slowly.  Take Flagyl as directed with food.  Do not have intercourse until completion of treatment and resolution of symptoms.  Keep her scheduled follow-up with OB/GYN.  Return to the ER for new or worsening symptoms.

## 2025-07-14 ENCOUNTER — PATIENT OUTREACH (OUTPATIENT)
Dept: LABOR AND DELIVERY | Facility: HOSPITAL | Age: 30
End: 2025-07-14
Payer: MEDICAID

## 2025-07-14 ENCOUNTER — REFERRAL TRIAGE (OUTPATIENT)
Dept: LABOR AND DELIVERY | Facility: HOSPITAL | Age: 30
End: 2025-07-14
Payer: MEDICAID

## 2025-07-14 NOTE — OUTREACH NOTE
[facility]  Enrollment    Current Estimated Gestational Age: 18w2d    Questions/Answers      Flowsheet Row Responses   Would like to participate? --  [welcome sent]        Salud Echevarria RN  Maternity Nurse Navigator    7/14/2025, 15:41 EDT

## 2025-07-19 ENCOUNTER — PATIENT OUTREACH (OUTPATIENT)
Dept: LABOR AND DELIVERY | Facility: HOSPITAL | Age: 30
End: 2025-07-19
Payer: MEDICAID

## 2025-07-21 ENCOUNTER — PATIENT OUTREACH (OUTPATIENT)
Dept: LABOR AND DELIVERY | Facility: HOSPITAL | Age: 30
End: 2025-07-21
Payer: MEDICAID

## 2025-07-21 NOTE — OUTREACH NOTE
[facility]  Enrollment    Current Estimated Gestational Age: 19w2d    Questions/Answers      Flowsheet Row Responses   Would like to participate? No            Contact info provided, encouraged to call if she has any questions, concerns, or needs assistance with resources.  MIGUEL Echevarria RN  Maternity Nurse Navigator    7/21/2025, 18:25 EDT

## 2025-08-20 ENCOUNTER — APPOINTMENT (OUTPATIENT)
Dept: ULTRASOUND IMAGING | Facility: HOSPITAL | Age: 30
End: 2025-08-20
Payer: MEDICAID

## 2025-08-20 ENCOUNTER — HOSPITAL ENCOUNTER (OUTPATIENT)
Facility: HOSPITAL | Age: 30
Discharge: HOME | End: 2025-08-21
Attending: STUDENT IN AN ORGANIZED HEALTH CARE EDUCATION/TRAINING PROGRAM | Admitting: OBSTETRICS & GYNECOLOGY
Payer: MEDICAID